# Patient Record
Sex: FEMALE | Race: WHITE | Employment: OTHER | URBAN - METROPOLITAN AREA
[De-identification: names, ages, dates, MRNs, and addresses within clinical notes are randomized per-mention and may not be internally consistent; named-entity substitution may affect disease eponyms.]

---

## 2017-02-17 ENCOUNTER — HOSPITAL ENCOUNTER (OUTPATIENT)
Dept: MAMMOGRAPHY | Age: 67
Discharge: HOME | End: 2017-02-17
Attending: OBSTETRICS & GYNECOLOGY
Payer: MEDICARE

## 2017-02-17 DIAGNOSIS — Z12.31 VISIT FOR SCREENING MAMMOGRAM: ICD-10-CM

## 2017-02-17 PROCEDURE — 77067 SCR MAMMO BI INCL CAD: CPT

## 2017-06-23 PROBLEM — J06.9 URI, ACUTE: Status: ACTIVE | Noted: 2017-06-23

## 2017-06-23 PROBLEM — R13.10 ODYNOPHAGIA: Status: ACTIVE | Noted: 2017-06-23

## 2017-06-30 PROBLEM — R53.83 FATIGUE: Status: ACTIVE | Noted: 2017-06-30

## 2017-06-30 PROBLEM — Z87.790 HISTORY OF BRANCHIAL CLEFT CYST: Status: ACTIVE | Noted: 2017-06-30

## 2017-06-30 PROBLEM — E06.9 THYROIDITIS: Status: ACTIVE | Noted: 2017-06-30

## 2017-07-11 ENCOUNTER — HOSPITAL ENCOUNTER (OUTPATIENT)
Dept: NUCLEAR MEDICINE | Age: 67
Discharge: HOME | End: 2017-07-11
Attending: FAMILY MEDICINE
Payer: MEDICARE

## 2017-07-11 DIAGNOSIS — R79.89 ABNORMAL TSH: ICD-10-CM

## 2017-07-12 ENCOUNTER — HOSPITAL ENCOUNTER (OUTPATIENT)
Dept: NUCLEAR MEDICINE | Age: 67
Discharge: HOME | End: 2017-07-12
Attending: FAMILY MEDICINE
Payer: MEDICARE

## 2017-07-12 PROCEDURE — 78014 THYROID IMAGING W/BLOOD FLOW: CPT

## 2017-07-18 PROBLEM — E06.1 THYROIDITIS, SUBACUTE: Status: ACTIVE | Noted: 2017-06-30

## 2017-07-18 PROBLEM — R07.9 CHEST PAIN: Status: ACTIVE | Noted: 2017-07-18

## 2017-08-03 PROBLEM — E04.2 MULTINODULAR GOITER: Status: ACTIVE | Noted: 2017-08-03

## 2017-08-15 PROBLEM — J06.9 URI, ACUTE: Status: RESOLVED | Noted: 2017-06-23 | Resolved: 2017-08-15

## 2017-09-01 ENCOUNTER — HOSPITAL ENCOUNTER (OUTPATIENT)
Dept: SURGERY | Age: 67
Discharge: HOME | End: 2017-09-01
Attending: SURGERY

## 2017-09-01 VITALS
SYSTOLIC BLOOD PRESSURE: 131 MMHG | HEART RATE: 74 BPM | DIASTOLIC BLOOD PRESSURE: 71 MMHG | OXYGEN SATURATION: 96 % | TEMPERATURE: 97.5 F | WEIGHT: 159 LBS | BODY MASS INDEX: 25.55 KG/M2 | HEIGHT: 66 IN

## 2017-09-01 NOTE — PERIOP NOTES
Patient verified name, , and surgery as listed in [facility]. Type 3 surgery, walk in assessment complete. Labs per surgeon: unknown; no orders received in EMR. Labs per anesthesia protocol: none needed. EKG: not needed per [facility] protocols. Hibiclens and instructions given per hospital policy. Patient provided with and instructed on educational handouts including Guide to Surgery, Pain Management, Hand Hygiene, Blood Transfusion Education, and [facility] Brochure. Patient answered medical/surgical history questions at their best of ability. All prior to admission medications documented in [facility]. Original medication prescription bottles not visualized during patient appointment. Patient instructed to hold all vitamins 7 days prior to surgery and NSAIDS 5 days prior to surgery, patient verbalized understanding. Medications to be held: vitamins. Patient instructed to continue previous medications as prescribed prior to surgery and to take the following medications the day of surgery according to anesthesia guidelines with a small sip of water: 81 mg aspirin, zyrtec, omeprazole, paxil. Patient teach back successful and patient demonstrates knowledge of instructions.

## 2017-09-12 ENCOUNTER — ANESTHESIA EVENT (OUTPATIENT)
Dept: SURGERY | Age: 67
End: 2017-09-12
Payer: MEDICARE

## 2017-09-12 RX ORDER — SODIUM CHLORIDE 0.9 % (FLUSH) 0.9 %
5-10 SYRINGE (ML) INJECTION AS NEEDED
Status: CANCELLED | OUTPATIENT
Start: 2017-09-12

## 2017-09-12 RX ORDER — SODIUM CHLORIDE 0.9 % (FLUSH) 0.9 %
5-10 SYRINGE (ML) INJECTION EVERY 8 HOURS
Status: CANCELLED | OUTPATIENT
Start: 2017-09-12

## 2017-09-12 NOTE — ANESTHESIA PREPROCEDURE EVALUATION
Anesthetic History     PONV          Review of Systems / Medical History  Patient summary reviewed, nursing notes reviewed and pertinent labs reviewed    Pulmonary                   Neuro/Psych              Cardiovascular                  Exercise tolerance: >4 METS     GI/Hepatic/Renal     GERD: well controlled           Endo/Other        Arthritis     Other Findings            Physical Exam    Airway  Mallampati: I  TM Distance: 4 - 6 cm  Neck ROM: normal range of motion   Mouth opening: Normal     Cardiovascular  Regular rate and rhythm,  S1 and S2 normal,  no murmur, click, rub, or gallop             Dental    Dentition: Caps/crowns     Pulmonary  Breath sounds clear to auscultation               Abdominal  GI exam deferred       Other Findings            Anesthetic Plan    ASA: 2  Anesthesia type: general            Anesthetic plan and risks discussed with: Patient and Spouse

## 2017-09-13 ENCOUNTER — ANESTHESIA (OUTPATIENT)
Dept: SURGERY | Age: 67
End: 2017-09-13
Payer: MEDICARE

## 2017-09-13 ENCOUNTER — HOSPITAL ENCOUNTER (OUTPATIENT)
Age: 67
Setting detail: OUTPATIENT SURGERY
Discharge: HOME | End: 2017-09-13
Attending: SURGERY | Admitting: SURGERY
Payer: MEDICARE

## 2017-09-13 VITALS
BODY MASS INDEX: 25.55 KG/M2 | HEART RATE: 62 BPM | RESPIRATION RATE: 18 BRPM | HEIGHT: 66 IN | SYSTOLIC BLOOD PRESSURE: 158 MMHG | DIASTOLIC BLOOD PRESSURE: 70 MMHG | OXYGEN SATURATION: 93 % | WEIGHT: 159 LBS | TEMPERATURE: 97.1 F

## 2017-09-13 DIAGNOSIS — N64.9 NIPPLE LESION: Primary | ICD-10-CM

## 2017-09-13 PROCEDURE — 76060000032 HC ANESTHESIA 0.5 TO 1 HR: Performed by: SURGERY

## 2017-09-13 PROCEDURE — 77030008477 HC STYL SATN SLP COVD -A: Performed by: ANESTHESIOLOGY

## 2017-09-13 PROCEDURE — 76210000020 HC REC RM PH II FIRST 0.5 HR: Performed by: SURGERY

## 2017-09-13 PROCEDURE — 74011000250 HC RX REV CODE- 250

## 2017-09-13 PROCEDURE — 74011250636 HC RX REV CODE- 250/636

## 2017-09-13 PROCEDURE — 77030020782 HC GWN BAIR PAWS FLX 3M -B: Performed by: ANESTHESIOLOGY

## 2017-09-13 PROCEDURE — 74011000250 HC RX REV CODE- 250: Performed by: SURGERY

## 2017-09-13 PROCEDURE — 88304 TISSUE EXAM BY PATHOLOGIST: CPT | Performed by: SURGERY

## 2017-09-13 PROCEDURE — 77030008703 HC TU ET UNCUF COVD -A: Performed by: ANESTHESIOLOGY

## 2017-09-13 PROCEDURE — 74011250636 HC RX REV CODE- 250/636: Performed by: ANESTHESIOLOGY

## 2017-09-13 PROCEDURE — 76010000138 HC OR TIME 0.5 TO 1 HR: Performed by: SURGERY

## 2017-09-13 PROCEDURE — 76210000016 HC OR PH I REC 1 TO 1.5 HR: Performed by: SURGERY

## 2017-09-13 PROCEDURE — 74011000258 HC RX REV CODE- 258: Performed by: SURGERY

## 2017-09-13 PROCEDURE — 74011000250 HC RX REV CODE- 250: Performed by: ANESTHESIOLOGY

## 2017-09-13 PROCEDURE — 74011250637 HC RX REV CODE- 250/637: Performed by: ANESTHESIOLOGY

## 2017-09-13 PROCEDURE — 74011250636 HC RX REV CODE- 250/636: Performed by: SURGERY

## 2017-09-13 RX ORDER — OXYCODONE AND ACETAMINOPHEN 5; 325 MG/1; MG/1
2 TABLET ORAL
Qty: 40 TAB | Refills: 0 | Status: SHIPPED | OUTPATIENT
Start: 2017-09-13 | End: 2018-01-19 | Stop reason: ALTCHOICE

## 2017-09-13 RX ORDER — ROCURONIUM BROMIDE 10 MG/ML
INJECTION, SOLUTION INTRAVENOUS AS NEEDED
Status: DISCONTINUED | OUTPATIENT
Start: 2017-09-13 | End: 2017-09-13 | Stop reason: HOSPADM

## 2017-09-13 RX ORDER — SODIUM CHLORIDE, SODIUM LACTATE, POTASSIUM CHLORIDE, CALCIUM CHLORIDE 600; 310; 30; 20 MG/100ML; MG/100ML; MG/100ML; MG/100ML
125 INJECTION, SOLUTION INTRAVENOUS CONTINUOUS
Status: DISCONTINUED | OUTPATIENT
Start: 2017-09-13 | End: 2017-09-13 | Stop reason: HOSPADM

## 2017-09-13 RX ORDER — ONDANSETRON 2 MG/ML
4 INJECTION INTRAMUSCULAR; INTRAVENOUS
Status: COMPLETED | OUTPATIENT
Start: 2017-09-13 | End: 2017-09-13

## 2017-09-13 RX ORDER — MIDAZOLAM HYDROCHLORIDE 1 MG/ML
2 INJECTION, SOLUTION INTRAMUSCULAR; INTRAVENOUS
Status: DISCONTINUED | OUTPATIENT
Start: 2017-09-13 | End: 2017-09-13 | Stop reason: HOSPADM

## 2017-09-13 RX ORDER — SCOLOPAMINE TRANSDERMAL SYSTEM 1 MG/1
1.5 PATCH, EXTENDED RELEASE TRANSDERMAL ONCE
Status: DISCONTINUED | OUTPATIENT
Start: 2017-09-13 | End: 2017-09-13 | Stop reason: HOSPADM

## 2017-09-13 RX ORDER — NALOXONE HYDROCHLORIDE 0.4 MG/ML
0.1 INJECTION, SOLUTION INTRAMUSCULAR; INTRAVENOUS; SUBCUTANEOUS AS NEEDED
Status: DISCONTINUED | OUTPATIENT
Start: 2017-09-13 | End: 2017-09-13 | Stop reason: HOSPADM

## 2017-09-13 RX ORDER — PROPOFOL 10 MG/ML
INJECTION, EMULSION INTRAVENOUS AS NEEDED
Status: DISCONTINUED | OUTPATIENT
Start: 2017-09-13 | End: 2017-09-13 | Stop reason: HOSPADM

## 2017-09-13 RX ORDER — KETOROLAC TROMETHAMINE 30 MG/ML
15 INJECTION, SOLUTION INTRAMUSCULAR; INTRAVENOUS AS NEEDED
Status: DISCONTINUED | OUTPATIENT
Start: 2017-09-13 | End: 2017-09-13 | Stop reason: HOSPADM

## 2017-09-13 RX ORDER — DEXAMETHASONE SODIUM PHOSPHATE 4 MG/ML
INJECTION, SOLUTION INTRA-ARTICULAR; INTRALESIONAL; INTRAMUSCULAR; INTRAVENOUS; SOFT TISSUE AS NEEDED
Status: DISCONTINUED | OUTPATIENT
Start: 2017-09-13 | End: 2017-09-13 | Stop reason: HOSPADM

## 2017-09-13 RX ORDER — HYDROMORPHONE HYDROCHLORIDE 2 MG/ML
0.5 INJECTION, SOLUTION INTRAMUSCULAR; INTRAVENOUS; SUBCUTANEOUS
Status: DISCONTINUED | OUTPATIENT
Start: 2017-09-13 | End: 2017-09-13 | Stop reason: HOSPADM

## 2017-09-13 RX ORDER — SUCCINYLCHOLINE CHLORIDE 20 MG/ML
INJECTION INTRAMUSCULAR; INTRAVENOUS AS NEEDED
Status: DISCONTINUED | OUTPATIENT
Start: 2017-09-13 | End: 2017-09-13 | Stop reason: HOSPADM

## 2017-09-13 RX ORDER — CEFOXITIN 2 G/1
2 INJECTION, POWDER, FOR SOLUTION INTRAVENOUS EVERY 6 HOURS
Status: DISCONTINUED | OUTPATIENT
Start: 2017-09-13 | End: 2017-09-13 | Stop reason: SDUPTHER

## 2017-09-13 RX ORDER — ACETAMINOPHEN 500 MG
1000 TABLET ORAL ONCE
Status: DISCONTINUED | OUTPATIENT
Start: 2017-09-13 | End: 2017-09-13 | Stop reason: HOSPADM

## 2017-09-13 RX ORDER — LIDOCAINE HYDROCHLORIDE 10 MG/ML
INJECTION INFILTRATION; PERINEURAL AS NEEDED
Status: DISCONTINUED | OUTPATIENT
Start: 2017-09-13 | End: 2017-09-13 | Stop reason: HOSPADM

## 2017-09-13 RX ORDER — SODIUM CHLORIDE 0.9 % (FLUSH) 0.9 %
5-10 SYRINGE (ML) INJECTION AS NEEDED
Status: DISCONTINUED | OUTPATIENT
Start: 2017-09-13 | End: 2017-09-13 | Stop reason: HOSPADM

## 2017-09-13 RX ORDER — OXYCODONE HYDROCHLORIDE 5 MG/1
10 TABLET ORAL
Status: DISCONTINUED | OUTPATIENT
Start: 2017-09-13 | End: 2017-09-13 | Stop reason: HOSPADM

## 2017-09-13 RX ORDER — FENTANYL CITRATE 50 UG/ML
INJECTION, SOLUTION INTRAMUSCULAR; INTRAVENOUS AS NEEDED
Status: DISCONTINUED | OUTPATIENT
Start: 2017-09-13 | End: 2017-09-13 | Stop reason: HOSPADM

## 2017-09-13 RX ORDER — LIDOCAINE HYDROCHLORIDE 10 MG/ML
0.1 INJECTION INFILTRATION; PERINEURAL AS NEEDED
Status: DISCONTINUED | OUTPATIENT
Start: 2017-09-13 | End: 2017-09-13 | Stop reason: HOSPADM

## 2017-09-13 RX ORDER — LIDOCAINE HYDROCHLORIDE 20 MG/ML
INJECTION, SOLUTION EPIDURAL; INFILTRATION; INTRACAUDAL; PERINEURAL AS NEEDED
Status: DISCONTINUED | OUTPATIENT
Start: 2017-09-13 | End: 2017-09-13 | Stop reason: HOSPADM

## 2017-09-13 RX ADMIN — SUCCINYLCHOLINE CHLORIDE 160 MG: 20 INJECTION INTRAMUSCULAR; INTRAVENOUS at 07:29

## 2017-09-13 RX ADMIN — MIDAZOLAM HYDROCHLORIDE 2 MG: 1 INJECTION, SOLUTION INTRAMUSCULAR; INTRAVENOUS at 07:18

## 2017-09-13 RX ADMIN — SODIUM CHLORIDE, SODIUM LACTATE, POTASSIUM CHLORIDE, AND CALCIUM CHLORIDE 125 ML/HR: 600; 310; 30; 20 INJECTION, SOLUTION INTRAVENOUS at 06:21

## 2017-09-13 RX ADMIN — LIDOCAINE HYDROCHLORIDE 0.1 ML: 10 INJECTION, SOLUTION INFILTRATION; PERINEURAL at 06:21

## 2017-09-13 RX ADMIN — PROPOFOL 180 MG: 10 INJECTION, EMULSION INTRAVENOUS at 07:29

## 2017-09-13 RX ADMIN — CEFOXITIN 2 G: 2 INJECTION, POWDER, FOR SOLUTION INTRAVENOUS at 07:22

## 2017-09-13 RX ADMIN — ROCURONIUM BROMIDE 5 MG: 10 INJECTION, SOLUTION INTRAVENOUS at 07:29

## 2017-09-13 RX ADMIN — FENTANYL CITRATE 100 MCG: 50 INJECTION, SOLUTION INTRAMUSCULAR; INTRAVENOUS at 07:29

## 2017-09-13 RX ADMIN — ONDANSETRON 4 MG: 2 INJECTION INTRAMUSCULAR; INTRAVENOUS at 09:11

## 2017-09-13 RX ADMIN — LIDOCAINE HYDROCHLORIDE 100 MG: 20 INJECTION, SOLUTION EPIDURAL; INFILTRATION; INTRACAUDAL; PERINEURAL at 07:29

## 2017-09-13 RX ADMIN — DEXAMETHASONE SODIUM PHOSPHATE 8 MG: 4 INJECTION, SOLUTION INTRA-ARTICULAR; INTRALESIONAL; INTRAMUSCULAR; INTRAVENOUS; SOFT TISSUE at 07:41

## 2017-09-13 NOTE — OP NOTES
[address] 64   OPERATIVE REPORT       Name:  Gerhard Martell   MR#:  [id number]   :  1950   Account #:  [de-identified]   Date of Adm:  2017       DATE OF SURGERY: 2017. PREOPERATIVE DIAGNOSIS: Right nipple cyst.    POSTOPERATIVE DIAGNOSIS: Right nipple cyst, 3 mm. PROCEDURE: Excision of right nipple cyst with sharp dissection   using a #15 scalpel blade. ANESTHESIA: General endotracheal.    SURGEON: Breanna Deal DO.    ASSISTANT: None. COMPLICATIONS: None. DISPOSITION: Stable. COUNTS: Sponge count, needle count, instrument count, all   correct x3. DESCRIPTION OF PROCEDURE: This is a 79-year-old female with a   nipple cyst causing her pain, itching, burning and drainage. She   has had a complete workup with negative biopsies and she is   requesting excision of the cyst. Consent was obtained by   describing the procedure to the patient, including the potential   complications to include infection, bleeding and pain. Consent   was obtained, placed upon the chart. She was administered Ancef   2 grams IV preoperatively, taken to the operative suite, placed   in a supine position. General anesthesia was initiated without   complications. She was then prepped and draped in the usual   sterile fashion and time-out was taken to confirm the patient's   name and proper procedure. The area had been marked   preoperatively and then using a #15 scalpel blade, an excision   of the inferiorly located 3 mm nipple cyst was performed   creating a pie-shaped incision. We dissected down the   subcutaneous tissue and excised the cyst circumferentially and   sent to Pathology for analysis. Pressure was held for 5 minutes   and hemostasis was confirmed. We injected with 1% lidocaine plain   and then reapproximated the skin using a 4-0 Prolene x3. The   patient tolerated the procedure well without immediate   complications. Sterile dressing was applied.     FINDINGS: A 3 mm right nipple cyst excised with a #15 scalpel   blade. She tolerated the procedure well. AYAZ Carney DD / Ashtyn Rivera   D:  09/13/2017   07:55   T:  09/13/2017   08:14   Job #:  [id number]

## 2017-09-13 NOTE — ANESTHESIA POSTPROCEDURE EVALUATION
Post-Anesthesia Evaluation and Assessment    Patient: Gustav Cabot MRN: [id number]  SSN: [id number]    YOB: 1950  Age: 75 y.o. Sex: female       Cardiovascular Function/Vital Signs  Visit Vitals    /69 (BP 1 Location: Right arm, BP Patient Position: At rest)    Pulse 65    Temp 36.2 °C (97.1 °F)    Resp 18    Ht 5' 6\" (1.676 m)    Wt 72.1 kg (159 lb)    SpO2 97%    BMI 25.66 kg/m2       Patient is status post general anesthesia for Procedure(s):  EXCISION OF RIGHT BREAST CYST . Nausea/Vomiting: None    Postoperative hydration reviewed and adequate. Pain:  Pain Scale 1: Visual (09/13/17 0823)  Pain Intensity 1: 0 (09/13/17 0823)   Managed    Neurological Status:   Neuro (WDL): Exceptions to WDL (09/13/17 4910)  Neuro  Neurologic State: Drowsy (09/13/17 9768)   At baseline    Mental Status and Level of Consciousness: Arousable    Pulmonary Status:   O2 Device: Nasal cannula (09/13/17 0838)   Adequate oxygenation and airway patent    Complications related to anesthesia: None    Post-anesthesia assessment completed.  No concerns    Signed By: Shilpa Carreon MD     September 13, 2017

## 2017-09-13 NOTE — INTERVAL H&P NOTE
H&P Update:  Frances Ceballos was seen and examined. History and physical has been reviewed. The patient has been examined.  There have been no significant clinical changes since the completion of the originally dated History and Physical.    Signed By: Ant Baig DO     September 13, 2017 7:14 AM

## 2017-09-13 NOTE — BRIEF OP NOTE
BRIEF OPERATIVE NOTE    Date of Procedure: 9/13/2017   Preoperative Diagnosis: Cyst of breast, right [N60.01]  Postoperative Diagnosis: Cyst of breast, right [N60.01]    Procedure(s):  EXCISION OF RIGHT BREAST CYST   Surgeon(s) and Role:     * John Dunn DO - Primary         Assistant Staff:       Surgical Staff:  Circ-1: Winifred Givens RN  Scrub Tech-1: Christa Wolff  Event Time In   Incision Start 0745   Incision Close 0754     Anesthesia: General   Estimated Blood Loss: Min  Specimens:   ID Type Source Tests Collected by Time Destination   1 : right nipple cyst Preservative Breast  John Dunn DO 9/13/2017 0747 Pathology      Findings: 3mm nipple cyst   Complications: none  Implants: * No implants in log [facility]
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
## 2017-09-13 NOTE — DISCHARGE INSTRUCTIONS
1. MAY SHOWER ONLY. NO TUB BATHING, HOT TUBS OR SWIMMING  2. KEEP INCISION CLEAN WITH REGULAR SOAP AND WATER  3. NO LIFTING OVER 10 POUNDS  4. REGULAR DIET AS TOLERATED  5. MAY REMOVE TOPICAL DRESSING ON DAY #2. LEAVE STERI STRIPS IN PLACE UNTIL SEEN IN OFFICE BY DR. Cassandra Pugh  6.  NO DRIVING WHILE ON PAIN MEDICATION  7. RESUME ALL HOME MEDICATIONS AS USUAL

## 2017-09-13 NOTE — IP AVS SNAPSHOT
Sandra Chand 
 
 
 [address] [address] 
[phone] Patient: Ewa Boateng MRN: [id number] UDW:0/57/3153 You are allergic to the following Allergen Reactions Sulfa (Sulfonamide Antibiotics) Hives Recent Documentation Height Weight BMI OB Status Smoking Status 1.676 m 72.1 kg 25.66 kg/m2 Hysterectomy Former Smoker Emergency Contacts Name Discharge Info Relation Home Work Mobile Benedict Chaudhari (Fiance) DISCHARGE CAREGIVER [3] Spouse [3] [phone] [address]  Son [22] [phone] About your hospitalization You were admitted on:  September 13, 2017 [facility] You were discharged on:  September 13, 2017 Unit phone number:  [phone] Why you were hospitalized Your primary diagnosis was:  Not on File Providers Seen During Your Hospitalizations Provider Role Specialty Primary office phone Elise Billingsley DO Attending Provider General Surgery [phone] Your Primary Care Physician (PCP) Primary Care Physician Office Phone Office Fax Andie Epstein [phone] Follow-up Information Follow up With Details Comments Contact Info Pham Dong MD   [address] [id number] [phone] Brady Betancourt DO Schedule an appointment as soon as possible for a visit in 2 week(s)  [address] [address] [address] [facility] [id number] [phone] Your Appointments Thursday September 21, 2017  9:30 AM EDT  
LAB with [facility] [address]  
[phone] Thursday September 28, 2017  1:30 PM EDT Global Post Op with Brady Betancourt DO  
[facility]) 88 [facility] 187 [facility] [phone] Monday October 02, 2017  3:15 PM EDT  
LONG with Nabila Howard MD  
[facility] MEDICINE ([address]) 96 [facility] 700 [facility]  
[phone] Monday October 09, 2017  2:00 PM EDT Follow Up with Marilyn Munson MD  
[facility] ENDOCRINOLOGY [facility] ENDOCRINOLOGY) [address] [facility] [address] [place] [id number] [phone] Current Discharge Medication List  
  
START taking these medications Dose & Instructions Dispensing Information Comments Morning Noon Evening Bedtime  
 oxyCODONE-acetaminophen 5-325 mg per tablet Commonly known as:  PERCOCET Your last dose was: Your next dose is:    
   
   
 Dose:  2 Tab Take 2 Tabs by mouth every four (4) hours as needed for Pain. Max Daily Amount: 12 Tabs. Quantity:  40 Tab Refills:  0 CONTINUE these medications which have NOT CHANGED Dose & Instructions Dispensing Information Comments Morning Noon Evening Bedtime  
 aspirin delayed-release 81 mg tablet Your last dose was: Your next dose is:    
   
   
 Dose:  81 mg Take 81 mg by mouth daily. Take / use AM day of surgery  per anesthesia protocols. Refills:  0  
     
   
   
   
  
 biotin 2,500 mcg Tab Your last dose was: Your next dose is:    
   
   
 Dose:  1 Tab Take 1 Tab by mouth two (2) times a day. Refills:  0  
     
   
   
   
  
 cholecalciferol 1,000 unit tablet Commonly known as:  VITAMIN D3 Your last dose was: Your next dose is:    
   
   
 Dose:  1000 Units Take 1,000 Units by mouth daily. Refills:  0  
     
   
   
   
  
 multivitamin tablet Commonly known as:  ONE A DAY Your last dose was: Your next dose is:    
   
   
 Dose:  1 Tab Take 1 tablet by mouth daily. Refills:  0  
     
   
   
   
  
 omeprazole 10 mg capsule Commonly known as:  PRILOSEC Your last dose was: Your next dose is:    
   
   
 Dose:  10 mg Take 1 Cap by mouth two (2) times a day. Indications: gastroesophageal reflux disease Quantity:  180 Cap Refills:  1 PARoxetine 10 mg tablet Commonly known as:  PAXIL Your last dose was: Your next dose is:    
   
   
 Dose:  10 mg Take 1 Tab by mouth daily. Quantity:  90 Tab Refills:  1 PROBIOTIC 4X 10-15 mg Tbec Generic drug:  B.infantis-B.ani-B.long-B.bifi Your last dose was: Your next dose is:    
   
   
 Dose:  1 Cap Take 1 Cap by mouth two (2) times a day. Refills:  0 ZyrTEC 10 mg tablet Generic drug:  cetirizine Your last dose was: Your next dose is:    
   
   
 Dose:  10 mg Take 10 mg by mouth daily. Take / use AM day of surgery  per anesthesia protocols. Indications: ALLERGIC RHINITIS Refills:  0 Where to Get Your Medications Information on where to get these meds will be given to you by the nurse or doctor. ! Ask your nurse or doctor about these medications  
  oxyCODONE-acetaminophen 5-325 mg per tablet Discharge Instructions 1. MAY SHOWER ONLY. NO TUB BATHING, HOT TUBS OR SWIMMING 2. KEEP INCISION CLEAN WITH REGULAR SOAP AND WATER 3. NO LIFTING OVER 10 POUNDS 
4. REGULAR DIET AS TOLERATED 5. MAY REMOVE TOPICAL DRESSING ON DAY #2. LEAVE STERI STRIPS IN PLACE UNTIL SEEN IN OFFICE BY DR. Donald Gupta 6. NO DRIVING WHILE ON PAIN MEDICATION 7. RESUME ALL HOME MEDICATIONS AS USUAL Discharge Orders None ACO Transitions of Care [address] offers a voluntary care coordination program to provide high quality service and care to [facility] fee-for-service beneficiaries. Yovana Nino was designed to help you enhance your health and well-being through the following services: ? Transitions of Care  support for individuals who are transitioning from one care setting to another (example: Hospital to home). ? Chronic and Complex Care Coordination  support for individuals and caregivers of those with serious or chronic illnesses or with more than one chronic (ongoing) condition and those who take a number of different medications. If you meet specific medical criteria, a [address] [place] [address] Rd may call you directly to coordinate your care with your primary care physician and your other care providers. For questions about the [facility] programs, please, contact your physicians office. For general questions or additional information about Accountable Care Organizations: 
Please visit www.medicare.gov/acos. html or call 1-800-MEDICARE (9-665.457.4103) TTY users should call 6-244.164.6677. Introducing [facility]! Dear Wilton Donohue: Thank you for requesting a Rezdy account. Our records indicate that you already have an active Rezdy account. You can access your account anytime at [email or web address] Did you know that you can access your hospital and ER discharge instructions at any time in Rezdy? You can also review all of your test results from your hospital stay or ER visit. Additional Information If you have questions, please visit the Frequently Asked Questions section of the Rezdy website at [email or web address] Remember, Rezdy is NOT to be used for urgent needs. For medical emergencies, dial 911. Now available from your iPhone and Android! General Information Please provide this summary of care documentation to your next provider.  
  
  
    
    
 Patient Signature: ____________________________________________________________ Date:  ____________________________________________________________  
  
Jonny Cart Provider Signature:  ____________________________________________________________ Date:  ____________________________________________________________

## 2017-10-02 PROBLEM — R53.83 FATIGUE: Status: RESOLVED | Noted: 2017-06-30 | Resolved: 2017-10-02

## 2017-10-02 PROBLEM — R07.9 CHEST PAIN: Status: RESOLVED | Noted: 2017-07-18 | Resolved: 2017-10-02

## 2018-01-10 ENCOUNTER — HOSPITAL ENCOUNTER (OUTPATIENT)
Dept: LAB | Age: 68
Discharge: HOME | End: 2018-01-10
Payer: MEDICARE

## 2018-01-10 DIAGNOSIS — E06.1 SUBACUTE THYROIDITIS: ICD-10-CM

## 2018-01-10 LAB
T4 FREE SERPL-MCNC: 0.8 NG/DL (ref 0.78–1.46)
TSH SERPL DL<=0.005 MIU/L-ACNC: 2.17 UIU/ML (ref 0.36–3.74)

## 2018-01-10 PROCEDURE — 84443 ASSAY THYROID STIM HORMONE: CPT | Performed by: INTERNAL MEDICINE

## 2018-01-10 PROCEDURE — 84439 ASSAY OF FREE THYROXINE: CPT | Performed by: INTERNAL MEDICINE

## 2018-01-10 PROCEDURE — 36415 COLL VENOUS BLD VENIPUNCTURE: CPT | Performed by: INTERNAL MEDICINE

## 2018-01-24 PROBLEM — C73 PAPILLARY THYROID CARCINOMA (HCC): Status: ACTIVE | Noted: 2018-01-24

## 2018-03-27 ENCOUNTER — HOSPITAL ENCOUNTER (OUTPATIENT)
Dept: MAMMOGRAPHY | Age: 68
Discharge: HOME | End: 2018-03-27
Attending: OBSTETRICS & GYNECOLOGY
Payer: MEDICARE

## 2018-03-27 DIAGNOSIS — Z12.31 VISIT FOR SCREENING MAMMOGRAM: ICD-10-CM

## 2018-03-27 PROCEDURE — 77063 BREAST TOMOSYNTHESIS BI: CPT

## 2018-04-20 PROBLEM — E89.0 POSTSURGICAL HYPOTHYROIDISM: Status: ACTIVE | Noted: 2018-04-20

## 2018-04-20 PROBLEM — E06.1 SUBACUTE THYROIDITIS: Status: RESOLVED | Noted: 2017-06-30 | Resolved: 2018-04-20

## 2018-04-20 PROBLEM — E04.2 MULTINODULAR GOITER: Status: RESOLVED | Noted: 2017-08-03 | Resolved: 2018-04-20

## 2018-10-31 PROBLEM — M25.552 PAIN OF BOTH HIP JOINTS: Status: ACTIVE | Noted: 2018-10-31

## 2018-10-31 PROBLEM — M25.551 PAIN OF BOTH HIP JOINTS: Status: ACTIVE | Noted: 2018-10-31

## 2019-01-09 ENCOUNTER — HOSPITAL ENCOUNTER (OUTPATIENT)
Dept: ULTRASOUND IMAGING | Age: 69
Discharge: HOME | End: 2019-01-09
Attending: INTERNAL MEDICINE
Payer: MEDICARE

## 2019-01-09 DIAGNOSIS — C73 PAPILLARY THYROID CARCINOMA (HCC): ICD-10-CM

## 2019-01-09 PROCEDURE — 76536 US EXAM OF HEAD AND NECK: CPT

## 2019-03-26 ENCOUNTER — HOSPITAL ENCOUNTER (OUTPATIENT)
Dept: PHYSICAL THERAPY | Age: 69
Discharge: HOME | End: 2019-03-26
Payer: MEDICARE

## 2019-03-26 PROCEDURE — 97162 PT EVAL MOD COMPLEX 30 MIN: CPT

## 2019-03-26 PROCEDURE — 97110 THERAPEUTIC EXERCISES: CPT

## 2019-03-27 NOTE — PROGRESS NOTES
Ambulatory/Rehab Services H2 Model Falls Risk Assessment    Risk Factors:       No Risk Factors Identified Ability to Rise from Chair:       (0)  Ability to rise in a single movement    Falls Prevention Plan:       No modifications necessary   Total: (5 or greater = High Risk): 0    ©2010 [facility]. All Rights Reserved. [facility] Patent #8,159,607.  Federal Law prohibits the replication, distribution or use without written permission from [facility]

## 2019-03-27 NOTE — THERAPY EVALUATION
Robel Valente  : 1950  [place] / [place] / Product Type: Medicare /  [address] [place]  at [address], [address]   [phone]   [phone]      OUTPATIENT PHYSICAL THERAPY:Initial Assessment 3/26/2019    ICD-10: Treatment Diagnosis:   M54.41 Lumbago with sciatica, right side     M54.42 Lumbago with sciatica, left side   Precautions/Allergies:   Hydrocodone-acetaminophen and Sulfa (sulfonamide antibiotics)   Fall Risk Score: 0 (? 5 = High Risk)  MD Orders: Evaluate and treat. MEDICAL/REFERRING DIAGNOSIS:  Spinal stenosis, lumbar region with neurogenic claudication [M48.062]   DATE OF ONSET: 2019  REFERRING PHYSICIAN: Ce Garcia PA  RETURN PHYSICIAN APPOINTMENT: TBD     INITIAL ASSESSMENT:  Ms. Lamberto Moody reported a recent onset of lumbago and intermittent bilateral LE radiculopathy in weight bearing postures. A recent MRI revealed lumbar DDD and stenosis, and she has been referred to physical therapy in an effort to decrease her symptoms. PROBLEM LIST (Impacting functional limitations):  1. Decreased Strength  2. Decreased ADL/Functional Activities  3. Increased Pain  4. Decreased Activity Tolerance INTERVENTIONS PLANNED:  1. Home Exercise Program (HEP)  2. Neuromuscular Re-education/Strengthening  3. Range of Motion (ROM)  4. Therapeutic Exercise/Strengthening   TREATMENT PLAN:  Effective Dates: 3/26/2019 TO 2019. Frequency/Duration: 2 times a week for 90 days  GOALS: (Goals have been discussed and agreed upon with patient.)  Short-Term Functional Goals: Time Frame: 30 days. 1. Decrease Oswestry Index 4 points to allow improved ADLs with sitting and lifting. 2. Decrease AROM limits to moderate to decrease pain 1-2 levels. 3. Increase lumbar strength to decrease pain 1-2 levels. Discharge Goals: Time Frame: 90 days. .  1. Decrease Oswestry Index 8 points to allow progression to an independent home exercise program.  2. Decrease trunk AROM limits to minimal to decrease pain +2 levels. 3. Demonstrate independence in home exercises to allow DC from physical therapy. Rehabilitation Potential For Stated Goals: Good  Regarding Katharine Chaudhari's therapy, I certify that the treatment plan above will be carried out by a therapist or under their direction. Thank you for this referral,  Mercedes Low, PT     Referring Physician Signature: ANNY Mensah              Date                    The information in this section was collected on 3/26/2019 (except where otherwise noted). HISTORY:   History of Present Injury/Illness (Reason for Referral): The patient reported a recent onset of lumbago and intermittent bilateral LE radiculopathy in weight bearing postures. A recent MRI revealed lumbar DDD and stenosis, and she has been referred to physical therapy in an effort to decrease her symptoms. Past Medical History/Comorbidities:   Ms. Tiffani Varela  has a past medical history of Allergic rhinitis, cause unspecified, Anxiety, Arthritis, Asthma, Cancer ([address] (12/2014), Depression, Dyslipidemia, Former smoker, GERD (gastroesophageal reflux disease), Hyperglycemia, Hypertension, Subacute thyroiditis (2017), Thyroid cancer ([address] and Unspecified sleep apnea. She also has no past medical history of Adverse effect of anesthesia, Aneurysm ([address] Arrhythmia, Autoimmune disease ([address] CAD (coronary artery disease), Chronic kidney disease, Chronic obstructive pulmonary disease ([address] Coagulation disorder ([address] Difficult intubation, Endocarditis, Heart failure ([address] Ill-defined condition, Liver disease, Malignant hyperthermia due to anesthesia, Nausea & vomiting, Nicotine vapor product user, Non-nicotine vapor product user, Pseudocholinesterase deficiency, PUD (peptic ulcer disease), Rheumatic fever, Seizures ([address] Stroke ([address] or Thromboembolus ([address]   Ms. Tiffani Varela  has a past surgical history that includes hx breast reduction (Bilateral, 2006); hx tonsillectomy (2000); hx adenoidectomy; hx orthopaedic (Right, 2014); hx orthopaedic (Bilateral); hx orthopaedic (Left); hx gastrectomy (2015); hx bunionectomy (Right, 2013); hx other surgical (1988/2002,2013); hx other surgical (Right, 1976); hx bunionectomy (Left, 2012); hx jory and bso (2000); hx colonoscopy (2012); hx other surgical (Right, 2017); and hx thyroidectomy (2018). Social History/Living Environment:    Independent  Prior Level of Function/Work/Activity:  Retired  Dominant Side:         RIGHT  Current Medications:       Current Outpatient Medications:     levothyroxine (SYNTHROID) 100 mcg tablet, Take 1 Tab by mouth Daily (before breakfast). , Disp: 90 Tab, Rfl: 3    montelukast (SINGULAIR) 10 mg tablet, Take 1 Tab by mouth daily. , Disp: 90 Tab, Rfl: 3    omeprazole (PRILOSEC) 20 mg capsule, Take 1 Cap by mouth two (2) times a day., Disp: 180 Cap, Rfl: 3    PARoxetine (PAXIL) 10 mg tablet, Take 1 Tab by mouth daily. , Disp: 90 Tab, Rfl: 1    varicella-zoster recombinant, PF, (SHINGRIX, PF,) 50 mcg/0.5 mL susr injection, 0.5mL by IntraMUSCular route once now and then repeat in 2-6 months, Disp: 0.5 mL, Rfl: 1    B.infantis-B.ani-B.long-B.bifi (PROBIOTIC 4X) 10-15 mg TbEC, Take 1 Cap by mouth two (2) times a day., Disp: , Rfl:     aspirin delayed-release 81 mg tablet, Take 81 mg by mouth daily. Take / use AM day of surgery  per anesthesia protocols. , Disp: , Rfl:     multivitamin (ONE A DAY) tablet, Take 1 tablet by mouth daily. , Disp: , Rfl:     cholecalciferol (VITAMIN D3) 1,000 unit tablet, Take 1,000 Units by mouth daily. , Disp: , Rfl:     biotin 2,500 mcg tab, Take 1 Tab by mouth two (2) times a day., Disp: , Rfl:    Date Last Reviewed:  3/26/2019   Number of Personal Factors/Comorbidities that affect the Plan of Care: 3+: HIGH COMPLEXITY   EXAMINATION:   Observation/Orthostatic Postural Assessment:    Mildly kyphotic.   Palpation:  Tenderness with palpation to the patient's lumbar spine. ROM: TRUNK AROM   Extension     Severe Limit   Flexion          Minimal Limit   Rotations      Moderate Limit                 Strength: LUMBAR SPINE   +3/5 abdominal strength   -4/5 R LE Strength    4/5 L LE Strength           Special Tests: N/A  Neurological Screen: SLR negative (B), but does experience bilateral LE radiculopathy with prolonged weight bearing activities. Functional Mobility: Independent   Balance:  Good. Body Structures Involved:  1. Bones  2. Joints  3. Muscles Body Functions Affected:  1. Sensory/Pain  2. Neuromusculoskeletal  3. Movement Related Activities and Participation Affected:  1. General Tasks and Demands  2. Mobility  3. Community, Social and [place]   Number of elements (examined above) that affect the Plan of Care: 4+: HIGH COMPLEXITY   CLINICAL PRESENTATION:   Presentation: Evolving clinical presentation with changing clinical characteristics: MODERATE COMPLEXITY   CLINICAL DECISION MAKING:   Outcome Measure: Tool Used: Modified Oswestry Low Back Pain Questionnaire  Score:  Initial: 14/50  Most Recent: X/50 (Date: -- )   Interpretation of Score: Each section is scored on a 0-5 scale, 5 representing the greatest disability. The scores of each section are added together for a total score of 50. Medical Necessity:   · Patient demonstrates good rehab potential due to higher previous functional level. Reason for Services/Other Comments:  · Patient continues to require skilled intervention due to her limited ability to perform weight bearing ADLs due to lumbago and bilateral sciatica. .   Use of outcome tool(s) and clinical judgement create a POC that gives a: Questionable prediction of patient's progress: MODERATE COMPLEXITY            TREATMENT:   (In addition to Assessment/Re-Assessment sessions the following treatments were rendered)  Pre-treatment Symptoms/Complaints:  The patient reported a recent onset of lumbago and intermittent bilateral LE radiculopathy in weight bearing postures. A recent MRI revealed lumbar DDD and stenosis, and she has been referred to physical therapy in an effort to decrease her symptoms. Pain: Initial:   Pain Intensity 1: 8  Post Session:  8   Therapeutic Exercise: ( 25): The patient received treatment as below to improve mobility, strength and balance. Required moderate verbal and manual cues to promote proper body alignment, promote proper body posture and promote proper body mechanics. Progressed resistance, range and repetitions as indicated. The patient received exercise instruction followed by patient demonstration of the exercises to include abdominals in supine to include single hip and knee extension, and posterior pelvic tilts. Also hip flexor flexibility exercises to stabilize the lumbar spine and improved AROM to decrease pain and spasms allowing improved function. [employer] Portal  Evaluation: ( X )  Manual Therapy (     ):   Therapeutic Modalities:                                                                                               HEP: As above; handouts given to patient for all exercises. Treatment/Session Assessment:    · Response to Treatment: The patient tolerated today's treatment without symptom exacerbation. The patient demonstrated independence with the initial home exercises and is to perform the exercises in conjunction with continued physical therapy. The patient should progress to an independent home exercise program within a few weeks  · Compliance with Program/Exercises: Will assess as treatment progresses. · Recommendations/Intent for next treatment session: \"Next visit will focus on advancements to more challenging activities\".   Total Treatment Duration:  PT Patient Time In/Time Out  Time In: 1300  Time Out: [address] PT

## 2019-03-28 ENCOUNTER — HOSPITAL ENCOUNTER (OUTPATIENT)
Dept: PHYSICAL THERAPY | Age: 69
Discharge: HOME | End: 2019-03-28
Payer: MEDICARE

## 2019-03-28 PROCEDURE — 97110 THERAPEUTIC EXERCISES: CPT

## 2019-03-28 NOTE — PROGRESS NOTES
Rodolfo Severs  : 1950  [place] / Product Type: Medicare /  [address]  at [facility]  [address], [address]  [phone]   [phone]      OUTPATIENT PHYSICAL THERAPY:Daily Note 3/28/2019    ICD-10: Treatment Diagnosis:   M54.41 Lumbago with sciatica, right side     M54.42 Lumbago with sciatica, left side   Precautions/Allergies:   Hydrocodone-acetaminophen and Sulfa (sulfonamide antibiotics)   Fall Risk Score: 0 (? 5 = High Risk)  MD Orders: Evaluate and treat. MEDICAL/REFERRING DIAGNOSIS:  Spinal stenosis, lumbar region with neurogenic claudication [M48.062]   DATE OF ONSET: 2019  REFERRING PHYSICIAN: Ce Garcia PA  RETURN PHYSICIAN APPOINTMENT: TBD     INITIAL ASSESSMENT:  Ms. Gracia Bermudez reported a recent onset of lumbago and intermittent bilateral LE radiculopathy in weight bearing postures. A recent MRI revealed lumbar DDD and stenosis, and she has been referred to physical therapy in an effort to decrease her symptoms. PROBLEM LIST (Impacting functional limitations):  1. Decreased Strength  2. Decreased ADL/Functional Activities  3. Increased Pain  4. Decreased Activity Tolerance INTERVENTIONS PLANNED:  1. Home Exercise Program (HEP)  2. Neuromuscular Re-education/Strengthening  3. Range of Motion (ROM)  4. Therapeutic Exercise/Strengthening   TREATMENT PLAN:  Effective Dates: 3/26/2019 TO 2019. Frequency/Duration: 2 times a week for 90 days  GOALS: (Goals have been discussed and agreed upon with patient.)  Short-Term Functional Goals: Time Frame: 30 days. 1. Decrease Oswestry Index 4 points to allow improved ADLs with sitting and lifting. 2. Decrease AROM limits to moderate to decrease pain 1-2 levels. 3. Increase lumbar strength to decrease pain 1-2 levels. Discharge Goals: Time Frame: 90 days. .  1. Decrease Oswestry Index 8 points to allow progression to an independent home exercise program.  2. Decrease trunk AROM limits to minimal to decrease pain +2 levels. 3. Demonstrate independence in home exercises to allow DC from physical therapy. Rehabilitation Potential For Stated Goals: Good  Regarding Pamela Chaudhari's therapy, I certify that the treatment plan above will be carried out by a therapist or under their direction. Thank you for this referral,  Antelmo Son, PT                 The information in this section was collected on 3/26/2019 (except where otherwise noted). HISTORY:   History of Present Injury/Illness (Reason for Referral): The patient reported a recent onset of lumbago and intermittent bilateral LE radiculopathy in weight bearing postures. A recent MRI revealed lumbar DDD and stenosis, and she has been referred to physical therapy in an effort to decrease her symptoms. Past Medical History/Comorbidities:   Ms. Omer Mccullough  has a past medical history of Allergic rhinitis, cause unspecified, Anxiety, Arthritis, Asthma, Cancer ([address] (12/2014), Depression, Dyslipidemia, Former smoker, GERD (gastroesophageal reflux disease), Hyperglycemia, Hypertension, Subacute thyroiditis (2017), Thyroid cancer ([address] and Unspecified sleep apnea. She also has no past medical history of Adverse effect of anesthesia, Aneurysm ([address] Arrhythmia, Autoimmune disease ([address] CAD (coronary artery disease), Chronic kidney disease, Chronic obstructive pulmonary disease ([address] Coagulation disorder ([address] Difficult intubation, Endocarditis, Heart failure ([address] Ill-defined condition, Liver disease, Malignant hyperthermia due to anesthesia, Nausea & vomiting, Nicotine vapor product user, Non-nicotine vapor product user, Pseudocholinesterase deficiency, PUD (peptic ulcer disease), Rheumatic fever, Seizures ([address] Stroke ([address] or Thromboembolus ([address]   Ms. Omer Mccullough  has a past surgical history that includes hx breast reduction (Bilateral, 2006); hx tonsillectomy (2000); hx adenoidectomy; hx orthopaedic (Right, 2014); hx orthopaedic (Bilateral); hx orthopaedic (Left); hx gastrectomy (2015); hx bunionectomy (Right, 2013); hx other surgical (1988/2002,2013); hx other surgical (Right, 1976); hx bunionectomy (Left, 2012); hx jory and bso (2000); hx colonoscopy (2012); hx other surgical (Right, 2017); and hx thyroidectomy (2018). Social History/Living Environment:    Independent  Prior Level of Function/Work/Activity:  Retired  Dominant Side:         RIGHT  Current Medications:       Current Outpatient Medications:     levothyroxine (SYNTHROID) 100 mcg tablet, Take 1 Tab by mouth Daily (before breakfast). , Disp: 90 Tab, Rfl: 3    montelukast (SINGULAIR) 10 mg tablet, Take 1 Tab by mouth daily. , Disp: 90 Tab, Rfl: 3    omeprazole (PRILOSEC) 20 mg capsule, Take 1 Cap by mouth two (2) times a day., Disp: 180 Cap, Rfl: 3    PARoxetine (PAXIL) 10 mg tablet, Take 1 Tab by mouth daily. , Disp: 90 Tab, Rfl: 1    varicella-zoster recombinant, PF, (SHINGRIX, PF,) 50 mcg/0.5 mL susr injection, 0.5mL by IntraMUSCular route once now and then repeat in 2-6 months, Disp: 0.5 mL, Rfl: 1    B.infantis-B.ani-B.long-B.bifi (PROBIOTIC 4X) 10-15 mg TbEC, Take 1 Cap by mouth two (2) times a day., Disp: , Rfl:     aspirin delayed-release 81 mg tablet, Take 81 mg by mouth daily. Take / use AM day of surgery  per anesthesia protocols. , Disp: , Rfl:     multivitamin (ONE A DAY) tablet, Take 1 tablet by mouth daily. , Disp: , Rfl:     cholecalciferol (VITAMIN D3) 1,000 unit tablet, Take 1,000 Units by mouth daily. , Disp: , Rfl:     biotin 2,500 mcg tab, Take 1 Tab by mouth two (2) times a day., Disp: , Rfl:    Date Last Reviewed:  3/28/2019   Number of Personal Factors/Comorbidities that affect the Plan of Care: 3+: HIGH COMPLEXITY   EXAMINATION:   Observation/Orthostatic Postural Assessment:    Mildly kyphotic. Palpation:  Tenderness with palpation to the patient's lumbar spine.   ROM: TRUNK AROM   Extension     Severe Limit   Flexion Minimal Limit   Rotations      Moderate Limit                 Strength: LUMBAR SPINE   +3/5 abdominal strength   -4/5 R LE Strength    4/5 L LE Strength           Special Tests: N/A  Neurological Screen: SLR negative (B), but does experience bilateral LE radiculopathy with prolonged weight bearing activities. Functional Mobility: Independent   Balance:  Good. Body Structures Involved:  1. Bones  2. Joints  3. Muscles Body Functions Affected:  1. Sensory/Pain  2. Neuromusculoskeletal  3. Movement Related Activities and Participation Affected:  1. General Tasks and Demands  2. Mobility  3. Community, Social and [place]   Number of elements (examined above) that affect the Plan of Care: 4+: HIGH COMPLEXITY   CLINICAL PRESENTATION:   Presentation: Evolving clinical presentation with changing clinical characteristics: MODERATE COMPLEXITY   CLINICAL DECISION MAKING:   Outcome Measure: Tool Used: Modified Oswestry Low Back Pain Questionnaire  Score:  Initial: 14/50  Most Recent: X/50 (Date: -- )   Interpretation of Score: Each section is scored on a 0-5 scale, 5 representing the greatest disability. The scores of each section are added together for a total score of 50. Medical Necessity:   · Patient demonstrates good rehab potential due to higher previous functional level. Reason for Services/Other Comments:  · Patient continues to require skilled intervention due to her limited ability to perform weight bearing ADLs due to lumbago and bilateral sciatica. .   Use of outcome tool(s) and clinical judgement create a POC that gives a: Questionable prediction of patient's progress: MODERATE COMPLEXITY            TREATMENT:   (In addition to Assessment/Re-Assessment sessions the following treatments were rendered)  Pre-treatment Symptoms/Complaints:  The patient reported muscle soreness today from the exercises. Pain: Initial:   Pain Intensity 1: 8  Post Session:  8   Therapeutic Exercise: ( 55):   The patient received treatment as below to improve mobility, strength and balance. Required moderate verbal and manual cues to promote proper body alignment, promote proper body posture and promote proper body mechanics. Progressed resistance, range and repetitions as indicated. The patient received exercise instruction followed by patient demonstration of the exercises to include abdominals in supine to include single hip and knee extension, and posterior pelvic tilts. Also hip flexor flexibility exercises to stabilize the lumbar spine and improved AROM to decrease pain and spasms allowing improved function. Date:  3/28/2019 Date:   Date:     Activity/Exercise Parameters Parameters Parameters   MAT  EXERCISES:      Hip ER / Abduction      Hip Adduction (Ball Squeeze)      Bridging      Straight Leg Raise      SAQ      Hip Abduction (Sidelying)      Gluteus Medius (Clam Shell)      Sit To Stand            STANDING HIP MACHINE:      Hip Abduction 12@ 2 x 10(B)     Hip Flexion 12@ 2 x 10(B)     Hip Extension 25@ 2 x 10(B)     Hip Adduction      Standing Knee Extension      4 Way Cable Walkout      Gluteus Medius (Crab Walk)      Knee Extension      Knee Flexion      Step Downs            Stationary Bike      Treadmill      NuStep 10 min           FLEXIBILITY:      Heel Cord      Hamstring 10 min     Hip Flexors 10 min     Trunk Rotators 10 min       [employer] Portal  Evaluation: (  )  Manual Therapy (     ):   Therapeutic Modalities:                                                                                               HEP: As above; handouts given to patient for all exercises. Treatment/Session Assessment:    · Response to Treatment: The patient tolerated today's treatment without symptom exacerbation. The patient demonstrated exercise tolerance to the new exercises and required manual and verbal cuing for proper technique.   The patient should progress to an independent home exercise program within a few weeks  · Compliance with Program/Exercises: Will assess as treatment progresses. · Recommendations/Intent for next treatment session: \"Next visit will focus on advancements to more challenging activities\".   Total Treatment Duration:  PT Patient Time In/Time Out  Time In: 1500  Time Out: Brad Villagomez, PT

## 2019-04-02 ENCOUNTER — HOSPITAL ENCOUNTER (OUTPATIENT)
Dept: PHYSICAL THERAPY | Age: 69
Discharge: HOME | End: 2019-04-02
Payer: MEDICARE

## 2019-04-02 PROCEDURE — 97110 THERAPEUTIC EXERCISES: CPT

## 2019-04-03 NOTE — PROGRESS NOTES
Nini Yuval  : 1950  [place] / [place] / Product Type: Medicare /  [address]  at [facility]  [address], [address]  [phone]   [phone]      OUTPATIENT PHYSICAL THERAPY:Daily Note 2019    ICD-10: Treatment Diagnosis:   M54.41 Lumbago with sciatica, right side     M54.42 Lumbago with sciatica, left side   Precautions/Allergies:   Hydrocodone-acetaminophen and Sulfa (sulfonamide antibiotics)   Fall Risk Score: 0 (? 5 = High Risk)  MD Orders: Evaluate and treat. MEDICAL/REFERRING DIAGNOSIS:  Spinal stenosis, lumbar region with neurogenic claudication [M48.062]   DATE OF ONSET: 2019  REFERRING PHYSICIAN: Ce Garcia PA  RETURN PHYSICIAN APPOINTMENT: TBD     INITIAL ASSESSMENT:  Ms. Angella Denise reported a recent onset of lumbago and intermittent bilateral LE radiculopathy in weight bearing postures. A recent MRI revealed lumbar DDD and stenosis, and she has been referred to physical therapy in an effort to decrease her symptoms. PROBLEM LIST (Impacting functional limitations):  1. Decreased Strength  2. Decreased ADL/Functional Activities  3. Increased Pain  4. Decreased Activity Tolerance INTERVENTIONS PLANNED:  1. Home Exercise Program (HEP)  2. Neuromuscular Re-education/Strengthening  3. Range of Motion (ROM)  4. Therapeutic Exercise/Strengthening   TREATMENT PLAN:  Effective Dates: 3/26/2019 TO 2019. Frequency/Duration: 2 times a week for 90 days  GOALS: (Goals have been discussed and agreed upon with patient.)  Short-Term Functional Goals: Time Frame: 30 days. 1. Decrease Oswestry Index 4 points to allow improved ADLs with sitting and lifting. 2. Decrease AROM limits to moderate to decrease pain 1-2 levels. 3. Increase lumbar strength to decrease pain 1-2 levels. Discharge Goals: Time Frame: 90 days. .  1. Decrease Oswestry Index 8 points to allow progression to an independent home exercise program.  2. Decrease trunk AROM limits to minimal to decrease pain +2 levels. 3. Demonstrate independence in home exercises to allow DC from physical therapy. Rehabilitation Potential For Stated Goals: Good  Regarding Sujit Chaudhari's therapy, I certify that the treatment plan above will be carried out by a therapist or under their direction. Thank you for this referral,  Juan Canada., PT                 The information in this section was collected on 3/26/2019 (except where otherwise noted). HISTORY:   History of Present Injury/Illness (Reason for Referral): The patient reported a recent onset of lumbago and intermittent bilateral LE radiculopathy in weight bearing postures. A recent MRI revealed lumbar DDD and stenosis, and she has been referred to physical therapy in an effort to decrease her symptoms. Past Medical History/Comorbidities:   Ms. Funmilayo Guillen  has a past medical history of Allergic rhinitis, cause unspecified, Anxiety, Arthritis, Asthma, Cancer ([address] (12/2014), Depression, Dyslipidemia, Former smoker, GERD (gastroesophageal reflux disease), Hyperglycemia, Hypertension, Subacute thyroiditis (2017), Thyroid cancer ([address] and Unspecified sleep apnea. She also has no past medical history of Adverse effect of anesthesia, Aneurysm ([address] Arrhythmia, Autoimmune disease ([address] CAD (coronary artery disease), Chronic kidney disease, Chronic obstructive pulmonary disease ([address] Coagulation disorder ([address] Difficult intubation, Endocarditis, Heart failure ([address] Ill-defined condition, Liver disease, Malignant hyperthermia due to anesthesia, Nausea & vomiting, Nicotine vapor product user, Non-nicotine vapor product user, Pseudocholinesterase deficiency, PUD (peptic ulcer disease), Rheumatic fever, Seizures ([address] Stroke ([address] or Thromboembolus ([address]   Ms. Funmilayo Guillen  has a past surgical history that includes hx breast reduction (Bilateral, 2006); hx tonsillectomy (2000); hx adenoidectomy; hx orthopaedic (Right, 2014); hx orthopaedic (Bilateral); hx orthopaedic (Left); hx gastrectomy (2015); hx bunionectomy (Right, 2013); hx other surgical (1988/2002,2013); hx other surgical (Right, 1976); hx bunionectomy (Left, 2012); hx jory and bso (2000); hx colonoscopy (2012); hx other surgical (Right, 2017); and hx thyroidectomy (2018). Social History/Living Environment:    Independent  Prior Level of Function/Work/Activity:  Retired  Dominant Side:         RIGHT  Current Medications:       Current Outpatient Medications:     levothyroxine (SYNTHROID) 100 mcg tablet, Take 1 Tab by mouth Daily (before breakfast). , Disp: 90 Tab, Rfl: 3    montelukast (SINGULAIR) 10 mg tablet, Take 1 Tab by mouth daily. , Disp: 90 Tab, Rfl: 3    omeprazole (PRILOSEC) 20 mg capsule, Take 1 Cap by mouth two (2) times a day., Disp: 180 Cap, Rfl: 3    PARoxetine (PAXIL) 10 mg tablet, Take 1 Tab by mouth daily. , Disp: 90 Tab, Rfl: 1    varicella-zoster recombinant, PF, (SHINGRIX, PF,) 50 mcg/0.5 mL susr injection, 0.5mL by IntraMUSCular route once now and then repeat in 2-6 months, Disp: 0.5 mL, Rfl: 1    B.infantis-B.ani-B.long-B.bifi (PROBIOTIC 4X) 10-15 mg TbEC, Take 1 Cap by mouth two (2) times a day., Disp: , Rfl:     aspirin delayed-release 81 mg tablet, Take 81 mg by mouth daily. Take / use AM day of surgery  per anesthesia protocols. , Disp: , Rfl:     multivitamin (ONE A DAY) tablet, Take 1 tablet by mouth daily. , Disp: , Rfl:     cholecalciferol (VITAMIN D3) 1,000 unit tablet, Take 1,000 Units by mouth daily. , Disp: , Rfl:     biotin 2,500 mcg tab, Take 1 Tab by mouth two (2) times a day., Disp: , Rfl:    Date Last Reviewed:  4/2/2019   Number of Personal Factors/Comorbidities that affect the Plan of Care: 3+: HIGH COMPLEXITY   EXAMINATION:   Observation/Orthostatic Postural Assessment:    Mildly kyphotic. Palpation:  Tenderness with palpation to the patient's lumbar spine.   ROM: TRUNK AROM   Extension     Severe Limit   Flexion Minimal Limit   Rotations      Moderate Limit                 Strength: LUMBAR SPINE   +3/5 abdominal strength   -4/5 R LE Strength    4/5 L LE Strength           Special Tests: N/A  Neurological Screen: SLR negative (B), but does experience bilateral LE radiculopathy with prolonged weight bearing activities. Functional Mobility: Independent   Balance:  Good. Body Structures Involved:  1. Bones  2. Joints  3. Muscles Body Functions Affected:  1. Sensory/Pain  2. Neuromusculoskeletal  3. Movement Related Activities and Participation Affected:  1. General Tasks and Demands  2. Mobility  3. Community, Social and [place]   Number of elements (examined above) that affect the Plan of Care: 4+: HIGH COMPLEXITY   CLINICAL PRESENTATION:   Presentation: Evolving clinical presentation with changing clinical characteristics: MODERATE COMPLEXITY   CLINICAL DECISION MAKING:   Outcome Measure: Tool Used: Modified Oswestry Low Back Pain Questionnaire  Score:  Initial: 14/50  Most Recent: X/50 (Date: -- )   Interpretation of Score: Each section is scored on a 0-5 scale, 5 representing the greatest disability. The scores of each section are added together for a total score of 50. Medical Necessity:   · Patient demonstrates good rehab potential due to higher previous functional level. Reason for Services/Other Comments:  · Patient continues to require skilled intervention due to her limited ability to perform weight bearing ADLs due to lumbago and bilateral sciatica. .   Use of outcome tool(s) and clinical judgement create a POC that gives a: Questionable prediction of patient's progress: MODERATE COMPLEXITY            TREATMENT:   (In addition to Assessment/Re-Assessment sessions the following treatments were rendered)  Pre-treatment Symptoms/Complaints:  The patient is encouraged with her exercise tolerance. Pain: Initial:   Pain Intensity 1: 8  Post Session:  6   Therapeutic Exercise: ( 55):   The patient received treatment as below to improve mobility, strength and balance. Required moderate verbal and manual cues to promote proper body alignment, promote proper body posture and promote proper body mechanics. Progressed resistance, range and repetitions as indicated. The patient received exercise instruction followed by patient demonstration of the exercises to include abdominals in supine to include single hip and knee extension, and posterior pelvic tilts. Also hip flexor flexibility exercises to stabilize the lumbar spine and improved AROM to decrease pain and spasms allowing improved function. Date:  3/28/2019 Date:  4/2/2019 Date:     Activity/Exercise Parameters Parameters Parameters   MAT  EXERCISES:      Hip ER / Abduction      Hip Adduction (Ball Squeeze)      Bridging      Straight Leg Raise      SAQ      Hip Abduction (Sidelying)      Gluteus Medius (Clam Shell)      Sit To Stand            STANDING HIP MACHINE:      Hip Abduction 12@ 2 x 10(B) 12@ 2 x 10(B)    Hip Flexion 12@ 2 x 10(B) 12@ 2 x 10(B)    Hip Extension 25@ 2 x 10(B) 25@ 2 x 10(B)    Hip Adduction      Standing Knee Extension      4 Way Cable Walkout      Gluteus Medius (Crab Walk)      Knee Extension  10@ 2 x 10(B)    Knee Flexion  15@ 2 x 10(B)    Step Downs            Stationary Bike      Treadmill      NuStep 10 min 15 min          FLEXIBILITY:      Heel Cord      Hamstring 10 min 10 min    Hip Flexors 10 min 10 min    Trunk Rotators 10 min 10 min      [facility] Portal  Evaluation: (  )  Manual Therapy (     ):   Therapeutic Modalities:                                                                                               HEP: As above; handouts given to patient for all exercises. Treatment/Session Assessment:    · Response to Treatment: The patient tolerated today's treatment without symptom exacerbation.   The patient demonstrated exercise tolerance to the exercises and required manual and verbal cuing for proper technique, and is encouraged. The patient should progress to an independent home exercise program within a few weeks  · Compliance with Program/Exercises: Will assess as treatment progresses. · Recommendations/Intent for next treatment session: \"Next visit will focus on advancements to more challenging activities\".   Total Treatment Duration:  PT Patient Time In/Time Out  Time In: 1400  Time Out: [address] Viridiana Cuevas., PT

## 2019-04-04 ENCOUNTER — HOSPITAL ENCOUNTER (OUTPATIENT)
Dept: PHYSICAL THERAPY | Age: 69
Discharge: HOME | End: 2019-04-04
Payer: MEDICARE

## 2019-04-04 PROCEDURE — 97110 THERAPEUTIC EXERCISES: CPT

## 2019-04-05 NOTE — PROGRESS NOTES
Kate Bryant  : 1950  [place] / Product Type: Medicare /  [address]  at [facility]  [address], [address]  [phone]   [phone]      OUTPATIENT PHYSICAL THERAPY:Daily Note 2019    ICD-10: Treatment Diagnosis:   M54.41 Lumbago with sciatica, right side     M54.42 Lumbago with sciatica, left side   Precautions/Allergies:   Hydrocodone-acetaminophen and Sulfa (sulfonamide antibiotics)   Fall Risk Score: 0 (? 5 = High Risk)  MD Orders: Evaluate and treat. MEDICAL/REFERRING DIAGNOSIS:  Spinal stenosis, lumbar region with neurogenic claudication [M48.062]   DATE OF ONSET: 2019  REFERRING PHYSICIAN: Ce Garcia PA  RETURN PHYSICIAN APPOINTMENT: TBD     INITIAL ASSESSMENT:  Ms. Aspen Esteban reported a recent onset of lumbago and intermittent bilateral LE radiculopathy in weight bearing postures. A recent MRI revealed lumbar DDD and stenosis, and she has been referred to physical therapy in an effort to decrease her symptoms. PROBLEM LIST (Impacting functional limitations):  1. Decreased Strength  2. Decreased ADL/Functional Activities  3. Increased Pain  4. Decreased Activity Tolerance INTERVENTIONS PLANNED:  1. Home Exercise Program (HEP)  2. Neuromuscular Re-education/Strengthening  3. Range of Motion (ROM)  4. Therapeutic Exercise/Strengthening   TREATMENT PLAN:  Effective Dates: 3/26/2019 TO 2019. Frequency/Duration: 2 times a week for 90 days  GOALS: (Goals have been discussed and agreed upon with patient.)  Short-Term Functional Goals: Time Frame: 30 days. 1. Decrease Oswestry Index 4 points to allow improved ADLs with sitting and lifting. 2. Decrease AROM limits to moderate to decrease pain 1-2 levels. 3. Increase lumbar strength to decrease pain 1-2 levels. Discharge Goals: Time Frame: 90 days. .  1. Decrease Oswestry Index 8 points to allow progression to an independent home exercise program.  2. Decrease trunk AROM limits to minimal to decrease pain +2 levels. 3. Demonstrate independence in home exercises to allow DC from physical therapy. Rehabilitation Potential For Stated Goals: Good  Regarding Pamela Chaudhari's therapy, I certify that the treatment plan above will be carried out by a therapist or under their direction. Thank you for this referral,  Antelmo Son, PT                 The information in this section was collected on 3/26/2019 (except where otherwise noted). HISTORY:   History of Present Injury/Illness (Reason for Referral): The patient reported a recent onset of lumbago and intermittent bilateral LE radiculopathy in weight bearing postures. A recent MRI revealed lumbar DDD and stenosis, and she has been referred to physical therapy in an effort to decrease her symptoms. Past Medical History/Comorbidities:   Ms. Omer Mccullough  has a past medical history of Allergic rhinitis, cause unspecified, Anxiety, Arthritis, Asthma, Cancer ([address] (12/2014), Depression, Dyslipidemia, Former smoker, GERD (gastroesophageal reflux disease), Hyperglycemia, Hypertension, Subacute thyroiditis (2017), Thyroid cancer ([address] and Unspecified sleep apnea. She also has no past medical history of Adverse effect of anesthesia, Aneurysm ([address] Arrhythmia, Autoimmune disease ([address] CAD (coronary artery disease), Chronic kidney disease, Chronic obstructive pulmonary disease ([address] Coagulation disorder ([address] Difficult intubation, Endocarditis, Heart failure ([address] Ill-defined condition, Liver disease, Malignant hyperthermia due to anesthesia, Nausea & vomiting, Nicotine vapor product user, Non-nicotine vapor product user, Pseudocholinesterase deficiency, PUD (peptic ulcer disease), Rheumatic fever, Seizures ([address] Stroke ([address] or Thromboembolus ([address]   Ms. Omer Mccullough  has a past surgical history that includes hx breast reduction (Bilateral, 2006); hx tonsillectomy (2000); hx adenoidectomy; hx orthopaedic (Right, 2014); hx orthopaedic (Bilateral); hx orthopaedic (Left); hx gastrectomy (2015); hx bunionectomy (Right, 2013); hx other surgical (1988/2002,2013); hx other surgical (Right, 1976); hx bunionectomy (Left, 2012); hx jory and bso (2000); hx colonoscopy (2012); hx other surgical (Right, 2017); and hx thyroidectomy (2018). Social History/Living Environment:    Independent  Prior Level of Function/Work/Activity:  Retired  Dominant Side:         RIGHT  Current Medications:       Current Outpatient Medications:     levothyroxine (SYNTHROID) 100 mcg tablet, Take 1 Tab by mouth Daily (before breakfast). , Disp: 90 Tab, Rfl: 3    montelukast (SINGULAIR) 10 mg tablet, Take 1 Tab by mouth daily. , Disp: 90 Tab, Rfl: 3    omeprazole (PRILOSEC) 20 mg capsule, Take 1 Cap by mouth two (2) times a day., Disp: 180 Cap, Rfl: 3    PARoxetine (PAXIL) 10 mg tablet, Take 1 Tab by mouth daily. , Disp: 90 Tab, Rfl: 1    varicella-zoster recombinant, PF, (SHINGRIX, PF,) 50 mcg/0.5 mL susr injection, 0.5mL by IntraMUSCular route once now and then repeat in 2-6 months, Disp: 0.5 mL, Rfl: 1    B.infantis-B.ani-B.long-B.bifi (PROBIOTIC 4X) 10-15 mg TbEC, Take 1 Cap by mouth two (2) times a day., Disp: , Rfl:     aspirin delayed-release 81 mg tablet, Take 81 mg by mouth daily. Take / use AM day of surgery  per anesthesia protocols. , Disp: , Rfl:     multivitamin (ONE A DAY) tablet, Take 1 tablet by mouth daily. , Disp: , Rfl:     cholecalciferol (VITAMIN D3) 1,000 unit tablet, Take 1,000 Units by mouth daily. , Disp: , Rfl:     biotin 2,500 mcg tab, Take 1 Tab by mouth two (2) times a day., Disp: , Rfl:    Date Last Reviewed:  4/4/2019   Number of Personal Factors/Comorbidities that affect the Plan of Care: 3+: HIGH COMPLEXITY   EXAMINATION:   Observation/Orthostatic Postural Assessment:    Mildly kyphotic. Palpation:  Tenderness with palpation to the patient's lumbar spine.   ROM: TRUNK AROM   Extension     Severe Limit   Flexion Minimal Limit   Rotations      Moderate Limit                 Strength: LUMBAR SPINE   +3/5 abdominal strength   -4/5 R LE Strength    4/5 L LE Strength           Special Tests: N/A  Neurological Screen: SLR negative (B), but does experience bilateral LE radiculopathy with prolonged weight bearing activities. Functional Mobility: Independent   Balance:  Good. Body Structures Involved:  1. Bones  2. Joints  3. Muscles Body Functions Affected:  1. Sensory/Pain  2. Neuromusculoskeletal  3. Movement Related Activities and Participation Affected:  1. General Tasks and Demands  2. Mobility  3. Community, Social and [place]   Number of elements (examined above) that affect the Plan of Care: 4+: HIGH COMPLEXITY   CLINICAL PRESENTATION:   Presentation: Evolving clinical presentation with changing clinical characteristics: MODERATE COMPLEXITY   CLINICAL DECISION MAKING:   Outcome Measure: Tool Used: Modified Oswestry Low Back Pain Questionnaire  Score:  Initial: 14/50  Most Recent: X/50 (Date: -- )   Interpretation of Score: Each section is scored on a 0-5 scale, 5 representing the greatest disability. The scores of each section are added together for a total score of 50. Medical Necessity:   · Patient demonstrates good rehab potential due to higher previous functional level. Reason for Services/Other Comments:  · Patient continues to require skilled intervention due to her limited ability to perform weight bearing ADLs due to lumbago and bilateral sciatica. .   Use of outcome tool(s) and clinical judgement create a POC that gives a: Questionable prediction of patient's progress: MODERATE COMPLEXITY            TREATMENT:   (In addition to Assessment/Re-Assessment sessions the following treatments were rendered)  Pre-treatment Symptoms/Complaints:  The patient continues to be encouraged with her therapy. Pain: Initial:   Pain Intensity 1: 6  Post Session:  4   Therapeutic Exercise: ( 55):   The patient received treatment as below to improve mobility, strength and balance. Required moderate verbal and manual cues to promote proper body alignment, promote proper body posture and promote proper body mechanics. Progressed resistance, range and repetitions as indicated. The patient received exercise instruction followed by patient demonstration of the exercises to include abdominals in supine to include single hip and knee extension, and posterior pelvic tilts. Also hip flexor flexibility exercises to stabilize the lumbar spine and improved AROM to decrease pain and spasms allowing improved function. Date:  3/28/2019 Date:  4/2/2019 Date:  4/4/2019   Activity/Exercise Parameters Parameters Parameters   MAT  EXERCISES:      Hip ER / Abduction      Hip Adduction (Ball Squeeze)      Bridging      Straight Leg Raise      SAQ      Hip Abduction (Sidelying)      Gluteus Medius (Clam Shell)      Sit To Stand            STANDING HIP MACHINE:      Hip Abduction 12@ 2 x 10(B) 12@ 2 x 10(B) 18@ 2 x 10(B)   Hip Flexion 12@ 2 x 10(B) 12@ 2 x 10(B) 18@ 2 x 10(B)   Hip Extension 25@ 2 x 10(B) 25@ 2 x 10(B) 32@ 2 x 10(B)   Hip Adduction      Standing Knee Extension      4 Way Cable Walkout      Gluteus Medius (Crab Walk)      Knee Extension  10@ 2 x 10(B) 15@ 2 x 10(B)   Knee Flexion  15@ 2 x 10(B) 15@ 2 x 10(B)   Step Downs            Stationary Bike      Treadmill      NuStep 10 min 15 min 15 min         FLEXIBILITY:      Heel Cord      Hamstring 10 min 10 min 10 min   Hip Flexors 10 min 10 min 10 min   Trunk Rotators 10 min 10 min 10 min     [facility] Portal  Evaluation: (  )  Manual Therapy (     ):   Therapeutic Modalities:                                                                                               HEP: As above; handouts given to patient for all exercises. Treatment/Session Assessment:    · Response to Treatment: The patient tolerated today's treatment without symptom exacerbation.   The patient demonstrated good exercise tolerance to the exercises with increased weights and required manual and verbal cuing for proper technique The patient should progress to an independent home exercise program within a few weeks  · Compliance with Program/Exercises: Will assess as treatment progresses. · Recommendations/Intent for next treatment session: \"Next visit will focus on advancements to more challenging activities\".   Total Treatment Duration:  PT Patient Time In/Time Out  Time In: 1300  Time Out: [address] PT

## 2019-04-09 ENCOUNTER — HOSPITAL ENCOUNTER (OUTPATIENT)
Dept: PHYSICAL THERAPY | Age: 69
Discharge: HOME | End: 2019-04-09
Payer: MEDICARE

## 2019-04-09 PROCEDURE — 97110 THERAPEUTIC EXERCISES: CPT

## 2019-04-09 NOTE — PROGRESS NOTES
Gil Patel  : 1950  [place] / [place] / Product Type: Medicare /  [address]  at [place] [id number] [address], [address]  [phone]   [phone]      OUTPATIENT PHYSICAL THERAPY:Daily Note 2019    ICD-10: Treatment Diagnosis:   M54.41 Lumbago with sciatica, right side     M54.42 Lumbago with sciatica, left side   Precautions/Allergies:   Hydrocodone-acetaminophen and Sulfa (sulfonamide antibiotics)   Fall Risk Score: 0 (? 5 = High Risk)  MD Orders: Evaluate and treat. MEDICAL/REFERRING DIAGNOSIS:  Spinal stenosis, lumbar region with neurogenic claudication [M48.062]   DATE OF ONSET: 2019  REFERRING PHYSICIAN: Ce Garcia PA  RETURN PHYSICIAN APPOINTMENT: TBD     INITIAL ASSESSMENT:  Ms. Shereen Tyson reported a recent onset of lumbago and intermittent bilateral LE radiculopathy in weight bearing postures. A recent MRI revealed lumbar DDD and stenosis, and she has been referred to physical therapy in an effort to decrease her symptoms. PROBLEM LIST (Impacting functional limitations):  1. Decreased Strength  2. Decreased ADL/Functional Activities  3. Increased Pain  4. Decreased Activity Tolerance INTERVENTIONS PLANNED:  1. Home Exercise Program (HEP)  2. Neuromuscular Re-education/Strengthening  3. Range of Motion (ROM)  4. Therapeutic Exercise/Strengthening   TREATMENT PLAN:  Effective Dates: 3/26/2019 TO 2019. Frequency/Duration: 2 times a week for 90 days  GOALS: (Goals have been discussed and agreed upon with patient.)  Short-Term Functional Goals: Time Frame: 30 days. 1. Decrease Oswestry Index 4 points to allow improved ADLs with sitting and lifting. 2. Decrease AROM limits to moderate to decrease pain 1-2 levels. 3. Increase lumbar strength to decrease pain 1-2 levels. Discharge Goals: Time Frame: 90 days. .  1. Decrease Oswestry Index 8 points to allow progression to an independent home exercise program.  2. Decrease trunk AROM limits to minimal to decrease pain +2 levels. 3. Demonstrate independence in home exercises to allow DC from physical therapy. Rehabilitation Potential For Stated Goals: Good  Regarding Jelly Chaudhari's therapy, I certify that the treatment plan above will be carried out by a therapist or under their direction. Thank you for this referral,  Laura Pruett., PT                 The information in this section was collected on 3/26/2019 (except where otherwise noted). HISTORY:   History of Present Injury/Illness (Reason for Referral): The patient reported a recent onset of lumbago and intermittent bilateral LE radiculopathy in weight bearing postures. A recent MRI revealed lumbar DDD and stenosis, and she has been referred to physical therapy in an effort to decrease her symptoms. Past Medical History/Comorbidities:   Ms. Rachel Scott  has a past medical history of Allergic rhinitis, cause unspecified, Anxiety, Arthritis, Asthma, Cancer ([address] (12/2014), Depression, Dyslipidemia, Former smoker, GERD (gastroesophageal reflux disease), Hyperglycemia, Hypertension, Subacute thyroiditis (2017), Thyroid cancer ([address] and Unspecified sleep apnea. She also has no past medical history of Adverse effect of anesthesia, Aneurysm ([address] Arrhythmia, Autoimmune disease ([address] CAD (coronary artery disease), Chronic kidney disease, Chronic obstructive pulmonary disease ([address] Coagulation disorder ([address] Difficult intubation, Endocarditis, Heart failure ([address] Ill-defined condition, Liver disease, Malignant hyperthermia due to anesthesia, Nausea & vomiting, Nicotine vapor product user, Non-nicotine vapor product user, Pseudocholinesterase deficiency, PUD (peptic ulcer disease), Rheumatic fever, Seizures ([address] Stroke ([address] or Thromboembolus ([address]   Ms. Rachel Scott  has a past surgical history that includes hx breast reduction (Bilateral, 2006); hx tonsillectomy (2000); hx adenoidectomy; hx orthopaedic (Right, 2014); hx orthopaedic (Bilateral); hx orthopaedic (Left); hx gastrectomy (2015); hx bunionectomy (Right, 2013); hx other surgical (1988/2002,2013); hx other surgical (Right, 1976); hx bunionectomy (Left, 2012); hx jory and bso (2000); hx colonoscopy (2012); hx other surgical (Right, 2017); and hx thyroidectomy (2018). Social History/Living Environment:    Independent  Prior Level of Function/Work/Activity:  Retired  Dominant Side:         RIGHT  Current Medications:       Current Outpatient Medications:     levothyroxine (SYNTHROID) 100 mcg tablet, Take 1 Tab by mouth Daily (before breakfast). , Disp: 90 Tab, Rfl: 3    montelukast (SINGULAIR) 10 mg tablet, Take 1 Tab by mouth daily. , Disp: 90 Tab, Rfl: 3    omeprazole (PRILOSEC) 20 mg capsule, Take 1 Cap by mouth two (2) times a day., Disp: 180 Cap, Rfl: 3    PARoxetine (PAXIL) 10 mg tablet, Take 1 Tab by mouth daily. , Disp: 90 Tab, Rfl: 1    varicella-zoster recombinant, PF, (SHINGRIX, PF,) 50 mcg/0.5 mL susr injection, 0.5mL by IntraMUSCular route once now and then repeat in 2-6 months, Disp: 0.5 mL, Rfl: 1    B.infantis-B.ani-B.long-B.bifi (PROBIOTIC 4X) 10-15 mg TbEC, Take 1 Cap by mouth two (2) times a day., Disp: , Rfl:     aspirin delayed-release 81 mg tablet, Take 81 mg by mouth daily. Take / use AM day of surgery  per anesthesia protocols. , Disp: , Rfl:     multivitamin (ONE A DAY) tablet, Take 1 tablet by mouth daily. , Disp: , Rfl:     cholecalciferol (VITAMIN D3) 1,000 unit tablet, Take 1,000 Units by mouth daily. , Disp: , Rfl:     biotin 2,500 mcg tab, Take 1 Tab by mouth two (2) times a day., Disp: , Rfl:    Date Last Reviewed:  4/9/2019   Number of Personal Factors/Comorbidities that affect the Plan of Care: 3+: HIGH COMPLEXITY   EXAMINATION:   Observation/Orthostatic Postural Assessment:    Mildly kyphotic. Palpation:  Tenderness with palpation to the patient's lumbar spine.   ROM: TRUNK AROM   Extension     Severe Limit   Flexion Minimal Limit   Rotations      Moderate Limit                 Strength: LUMBAR SPINE   +3/5 abdominal strength   -4/5 R LE Strength    4/5 L LE Strength           Special Tests: N/A  Neurological Screen: SLR negative (B), but does experience bilateral LE radiculopathy with prolonged weight bearing activities. Functional Mobility: Independent   Balance:  Good. Body Structures Involved:  1. Bones  2. Joints  3. Muscles Body Functions Affected:  1. Sensory/Pain  2. Neuromusculoskeletal  3. Movement Related Activities and Participation Affected:  1. General Tasks and Demands  2. Mobility  3. Community, Social and [place]   Number of elements (examined above) that affect the Plan of Care: 4+: HIGH COMPLEXITY   CLINICAL PRESENTATION:   Presentation: Evolving clinical presentation with changing clinical characteristics: MODERATE COMPLEXITY   CLINICAL DECISION MAKING:   Outcome Measure: Tool Used: Modified Oswestry Low Back Pain Questionnaire  Score:  Initial: 14/50  Most Recent: X/50 (Date: -- )   Interpretation of Score: Each section is scored on a 0-5 scale, 5 representing the greatest disability. The scores of each section are added together for a total score of 50. Medical Necessity:   · Patient demonstrates good rehab potential due to higher previous functional level. Reason for Services/Other Comments:  · Patient continues to require skilled intervention due to her limited ability to perform weight bearing ADLs due to lumbago and bilateral sciatica. .   Use of outcome tool(s) and clinical judgement create a POC that gives a: Questionable prediction of patient's progress: MODERATE COMPLEXITY            TREATMENT:   (In addition to Assessment/Re-Assessment sessions the following treatments were rendered)  Pre-treatment Symptoms/Complaints:  The patient reported that she visited with her doctor and he recommends a laminectomy. She is open to the idea but her husbands back may take precedence.   Pain: Initial:   Pain Intensity 1: 6  Post Session:  4   Therapeutic Exercise: ( 55): The patient received treatment as below to improve mobility, strength and balance. Required moderate verbal and manual cues to promote proper body alignment, promote proper body posture and promote proper body mechanics. Progressed resistance, range and repetitions as indicated. The patient received exercise instruction followed by patient demonstration of the exercises to include abdominals in supine to include single hip and knee extension, and posterior pelvic tilts. Also hip flexor flexibility exercises to stabilize the lumbar spine and improved AROM to decrease pain and spasms allowing improved function.    Date:  3/28/2019 Date:  4/2/2019 Date:  4/4/2019 Date:  4/9/2019     Activity/Exercise Parameters Parameters Parameters      MAT  EXERCISES:         Hip ER / Abduction         Hip Adduction (Ball Squeeze)         Bridging         Straight Leg Raise         SAQ         Hip Abduction (Sidelying)         Gluteus Medius (Clam Shell)         Sit To Stand                  STANDING HIP MACHINE:         Hip Abduction 12@ 2 x 10(B) 12@ 2 x 10(B) 18@ 2 x 10(B) 18@ 3 x 10(B)     Hip Flexion 12@ 2 x 10(B) 12@ 2 x 10(B) 18@ 2 x 10(B) 18@ 3 x 10(B)     Hip Extension 25@ 2 x 10(B) 25@ 2 x 10(B) 32@ 2 x 10(B) 32@ 3 x 10(B)     Hip Adduction         Standing Knee Extension         4 Way Cable Walkout         Gluteus Medius (Crab Walk)         Knee Extension  10@ 2 x 10(B) 15@ 2 x 10(B) 15@ 3 x 10(B)     Knee Flexion  15@ 2 x 10(B) 15@ 2 x 10(B) 15@ 3 x 10(B)     Step Downs                  Stationary Bike         Treadmill         NuStep 10 min 15 min 15 min 15 min              FLEXIBILITY:         Heel Cord         Hamstring 10 min 10 min 10 min 10 min     Hip Flexors 10 min 10 min 10 min 10 min     Trunk Rotators 10 min 10 min 10 min 10 min       [facility]  Evaluation: (  )  Manual Therapy (     ):   Therapeutic Modalities: HEP: As above; handouts given to patient for all exercises. Treatment/Session Assessment:    · Response to Treatment: The patient tolerated today's treatment without symptom exacerbation. The patient demonstrated good exercise tolerance to the exercises with increased sets and required manual and verbal cuing for proper technique The patient is considering back surgery at this time. · Compliance with Program/Exercises: Will assess as treatment progresses. · Recommendations/Intent for next treatment session: \"Next visit will focus on advancements to more challenging activities\".   Total Treatment Duration:  PT Patient Time In/Time Out  Time In: 1300  Time Out: [address] PT

## 2019-04-11 ENCOUNTER — HOSPITAL ENCOUNTER (OUTPATIENT)
Dept: PHYSICAL THERAPY | Age: 69
Discharge: HOME | End: 2019-04-11
Payer: MEDICARE

## 2019-04-11 NOTE — PROGRESS NOTES
Leodan Abbasi  : 1950  [place]  Secondary: [address] at [address], [address] [place] [address] Rd  [phone]   [phone]      OUTPATIENT DAILY NOTE    NAME/AGE/GENDER: Leodan Abbasi is a 70 y.o. female. DATE: 2019    Patient cancelled  appointment today due to schedule conflicit. Will plan to follow up on next scheduled visit.     Jairo James, PTA

## 2019-04-25 RX ORDER — CEFAZOLIN SODIUM/WATER 2 G/20 ML
2 SYRINGE (ML) INTRAVENOUS ONCE
Status: CANCELLED | OUTPATIENT
Start: 2019-04-25 | End: 2019-04-26

## 2019-05-08 ENCOUNTER — HOSPITAL ENCOUNTER (OUTPATIENT)
Dept: SURGERY | Age: 69
Discharge: HOME | End: 2019-05-08
Payer: MEDICARE

## 2019-05-08 VITALS
WEIGHT: 157 LBS | BODY MASS INDEX: 26.16 KG/M2 | OXYGEN SATURATION: 94 % | HEART RATE: 70 BPM | RESPIRATION RATE: 16 BRPM | TEMPERATURE: 98.1 F | HEIGHT: 65 IN | DIASTOLIC BLOOD PRESSURE: 79 MMHG | SYSTOLIC BLOOD PRESSURE: 150 MMHG

## 2019-05-08 LAB
BACTERIA SPEC CULT: NORMAL
SERVICE CMNT-IMP: NORMAL

## 2019-05-08 PROCEDURE — 87641 MR-STAPH DNA AMP PROBE: CPT

## 2019-05-08 PROCEDURE — 77030027138 HC INCENT SPIROMETER -A

## 2019-05-08 RX ORDER — OMEPRAZOLE 10 MG/1
10 CAPSULE, DELAYED RELEASE ORAL DAILY
COMMUNITY

## 2019-05-08 RX ORDER — DIPHENHYDRAMINE HCL 25 MG
25 CAPSULE ORAL
COMMUNITY
End: 2021-02-22

## 2019-05-08 NOTE — PERIOP NOTES
Patient verified name, , and surgery as listed in [facility]. Patient provided medical/health information and PTA medications to the best of their ability. TYPE  CASE: ll  Orders per surgeon: were received and dated    Labs per surgeon: MRSA. Labs per anesthesia protocol: cbc, cmp and urinalysis done by Dr Wills Prior on 2019 brought by patient and placed in chart and acceptable per anesthesia protocol. EKG:  Last ekg was 2018, in care everywhere, patient denies any hx of CP, ALBERTO or CAD     Nasal Swab collected per MD order and instructions for Mupirocin nasal ointment if required. Patient provided with and instructed on education handouts including Guide to Surgery, blood transfusions, pain management, and hand hygiene for the family and community, and [facility] brochure. Road to Recovery Spine surgery patient guide given. Instructed on incentive spirometry with return demonstration. Long handled prehab sponge given with instructions for use. Patient viewed spine prehab video. Hibiclens and instructions given per hospital policy. Instructed patient to continue previous medications as prescribed prior to surgery unless otherwise directed and to take the following medications the day of surgery according to anesthesia guidelines : levothyroxine, omeprazole, paroxitne . Instructed patient to hold  the following medications on the day of surgery: vitamins. Original medication prescription bottles were visualized during patient appointment. Patient teach back successful and patient demonstrates knowledge of instruction.

## 2019-05-10 NOTE — H&P
Allergies:Sulfa Medications:Aspirin (81 MG); Benadryl;Biotin;Levothyroxine Sodium (100 MCG); Montelukast Sodium (10 MG);Multivitamin; Omeprazole (20 MG);PARoxetine HCl (10 MG); Super B-Complex; Vitamin D3 
 
CC: LOW BACK AND LEG PAIN RECHECK 
 
HPI:  The patient is 78years-old. She has spinal stenosis at L3-4 and L4-5, but it is worse on the right side. The stenosis at L4-5 is worse. At L5-S1, she has lateral recess stenosis on the left and some L5 foraminal narrowing. I think there is some bulging disc. An L4-5 epidural block gave her 80% relief and it really helped the hip pain that she had and she thought she had arthritic hips. We then tried a right-sided L3 and L4 selective nerve root block, but she fell shortly after that and had issues from the fall so we were never really sure how much it helped. She has been thinking about it and she is in good shape now. She wants to go ahead and possibly do surgery. We reviewed the scan and I told I thought she would need L3 to S1 laminectomy. At S1, basically mainly do the left side. At L3-4 and L4-5, concentrate on the right side. Went through her medical history and really she is pretty healthy. She is nondiabetic. She had thyroid cancer last year that was treated surgically. Otherwise no major medical issues. The only blood thinner is aspirin,. She does not have stents in the heart. She does not smoke. I talked about the surgery in detail. I told her it would be a 1 or 2 night stay and have her do a lot of working once she gets home and do therapy somewhat about 4 weeks after surgery. Went through risks, including death, paralysis, infection, bleeding, transfusion, heart attack, stroke, clot in leg, clot in lung, dural tear, recurrent back problems and the fact I cannot guarantee pain relief. She is concerned about if we did the decompression would she need a fusion down the road. I told her I could not guarantee, but I am not anticipating that.   I answered all of her questions and she would like to proceed. I went ahead and examined her. PE:  She is a healthy-appearing  female with no gross deformities alert and oriented x3. Her pupils are equal, round and reactive to light. Oropharynx clear. Neck is without adenopathy or bruit. Her lungs were clear to auscultation bilaterally. Heart is regular rate and rhythm without murmur. Her abdomen is soft and nontender. No hepatosplenomegaly. ASSESSMENT/PLAN:  The patient has spinal stenosis with back and leg pain and claudication symptoms. Very limited on standing and walking. We would do an L3 to S1 laminectomy.    
 
 
Electronically Signed By Gabriela Trinidad MD

## 2019-05-15 ENCOUNTER — ANESTHESIA EVENT (OUTPATIENT)
Dept: SURGERY | Age: 69
End: 2019-05-15
Payer: MEDICARE

## 2019-05-16 ENCOUNTER — APPOINTMENT (OUTPATIENT)
Dept: GENERAL RADIOLOGY | Age: 69
End: 2019-05-16
Attending: ORTHOPAEDIC SURGERY
Payer: MEDICARE

## 2019-05-16 ENCOUNTER — HOSPITAL ENCOUNTER (OUTPATIENT)
Age: 69
Setting detail: OBSERVATION
Discharge: HOME | End: 2019-05-19
Attending: ORTHOPAEDIC SURGERY | Admitting: ORTHOPAEDIC SURGERY
Payer: MEDICARE

## 2019-05-16 ENCOUNTER — ANESTHESIA (OUTPATIENT)
Dept: SURGERY | Age: 69
End: 2019-05-16
Payer: MEDICARE

## 2019-05-16 DIAGNOSIS — M48.061 SPINAL STENOSIS OF LUMBAR REGION AT MULTIPLE LEVELS: Primary | ICD-10-CM

## 2019-05-16 PROBLEM — M48.00 SPINAL STENOSIS: Status: ACTIVE | Noted: 2019-05-16

## 2019-05-16 PROCEDURE — 77030003028 HC SUT VCRL J&J -A: Performed by: ORTHOPAEDIC SURGERY

## 2019-05-16 PROCEDURE — 77030032490 HC SLV COMPR SCD KNE COVD -B: Performed by: ORTHOPAEDIC SURGERY

## 2019-05-16 PROCEDURE — 74011250637 HC RX REV CODE- 250/637: Performed by: ANESTHESIOLOGY

## 2019-05-16 PROCEDURE — 74011250636 HC RX REV CODE- 250/636: Performed by: ORTHOPAEDIC SURGERY

## 2019-05-16 PROCEDURE — 74011250636 HC RX REV CODE- 250/636: Performed by: ANESTHESIOLOGY

## 2019-05-16 PROCEDURE — 72020 X-RAY EXAM OF SPINE 1 VIEW: CPT

## 2019-05-16 PROCEDURE — 77030039425 HC BLD LARYNG TRULITE DISP TELE -A: Performed by: ANESTHESIOLOGY

## 2019-05-16 PROCEDURE — 77030027138 HC INCENT SPIROMETER -A

## 2019-05-16 PROCEDURE — 99218 HC RM OBSERVATION: CPT

## 2019-05-16 PROCEDURE — 74011250636 HC RX REV CODE- 250/636

## 2019-05-16 PROCEDURE — 77030025623 HC BUR RND PRECIS STRY -D: Performed by: ORTHOPAEDIC SURGERY

## 2019-05-16 PROCEDURE — 77030037088 HC TUBE ENDOTRACH ORAL NSL COVD-A: Performed by: ANESTHESIOLOGY

## 2019-05-16 PROCEDURE — 77030031139 HC SUT VCRL2 J&J -A: Performed by: ORTHOPAEDIC SURGERY

## 2019-05-16 PROCEDURE — 74011000250 HC RX REV CODE- 250: Performed by: ORTHOPAEDIC SURGERY

## 2019-05-16 PROCEDURE — 77030014647 HC SEAL FBRN TISSL BAXT -D: Performed by: ORTHOPAEDIC SURGERY

## 2019-05-16 PROCEDURE — 77030018836 HC SOL IRR NACL ICUM -A: Performed by: ORTHOPAEDIC SURGERY

## 2019-05-16 PROCEDURE — 76060000035 HC ANESTHESIA 2 TO 2.5 HR: Performed by: ORTHOPAEDIC SURGERY

## 2019-05-16 PROCEDURE — 77030019908 HC STETH ESOPH SIMS -A: Performed by: ANESTHESIOLOGY

## 2019-05-16 PROCEDURE — 77030019940 HC BLNKT HYPOTHRM STRY -B: Performed by: ANESTHESIOLOGY

## 2019-05-16 PROCEDURE — 76010000171 HC OR TIME 2 TO 2.5 HR INTENSV-TIER 1: Performed by: ORTHOPAEDIC SURGERY

## 2019-05-16 PROCEDURE — 77030012894: Performed by: ORTHOPAEDIC SURGERY

## 2019-05-16 PROCEDURE — 74011250637 HC RX REV CODE- 250/637: Performed by: ORTHOPAEDIC SURGERY

## 2019-05-16 PROCEDURE — 77030012406 HC DRN WND PENRS BARD -A: Performed by: ORTHOPAEDIC SURGERY

## 2019-05-16 PROCEDURE — 77030029099 HC BN WAX SSPC -A: Performed by: ORTHOPAEDIC SURGERY

## 2019-05-16 PROCEDURE — 76210000016 HC OR PH I REC 1 TO 1.5 HR: Performed by: ORTHOPAEDIC SURGERY

## 2019-05-16 PROCEDURE — 74011000250 HC RX REV CODE- 250

## 2019-05-16 PROCEDURE — 74011000272 HC RX REV CODE- 272: Performed by: ORTHOPAEDIC SURGERY

## 2019-05-16 RX ORDER — ROCURONIUM BROMIDE 10 MG/ML
INJECTION, SOLUTION INTRAVENOUS AS NEEDED
Status: DISCONTINUED | OUTPATIENT
Start: 2019-05-16 | End: 2019-05-16 | Stop reason: HOSPADM

## 2019-05-16 RX ORDER — ONDANSETRON 2 MG/ML
4 INJECTION INTRAMUSCULAR; INTRAVENOUS
Status: DISCONTINUED | OUTPATIENT
Start: 2019-05-16 | End: 2019-05-19 | Stop reason: HOSPADM

## 2019-05-16 RX ORDER — SODIUM CHLORIDE 0.9 % (FLUSH) 0.9 %
5-40 SYRINGE (ML) INJECTION AS NEEDED
Status: DISCONTINUED | OUTPATIENT
Start: 2019-05-16 | End: 2019-05-16 | Stop reason: HOSPADM

## 2019-05-16 RX ORDER — SODIUM CHLORIDE 0.9 % (FLUSH) 0.9 %
5-40 SYRINGE (ML) INJECTION EVERY 8 HOURS
Status: DISCONTINUED | OUTPATIENT
Start: 2019-05-16 | End: 2019-05-19 | Stop reason: HOSPADM

## 2019-05-16 RX ORDER — FENTANYL CITRATE 50 UG/ML
100 INJECTION, SOLUTION INTRAMUSCULAR; INTRAVENOUS ONCE
Status: DISCONTINUED | OUTPATIENT
Start: 2019-05-16 | End: 2019-05-16 | Stop reason: HOSPADM

## 2019-05-16 RX ORDER — IBUPROFEN 400 MG/1
400 TABLET ORAL
Status: DISCONTINUED | OUTPATIENT
Start: 2019-05-16 | End: 2019-05-19 | Stop reason: HOSPADM

## 2019-05-16 RX ORDER — DEXTROSE, SODIUM CHLORIDE, AND POTASSIUM CHLORIDE 5; .45; .15 G/100ML; G/100ML; G/100ML
100 INJECTION INTRAVENOUS CONTINUOUS
Status: DISCONTINUED | OUTPATIENT
Start: 2019-05-16 | End: 2019-05-19 | Stop reason: HOSPADM

## 2019-05-16 RX ORDER — SODIUM CHLORIDE, SODIUM LACTATE, POTASSIUM CHLORIDE, CALCIUM CHLORIDE 600; 310; 30; 20 MG/100ML; MG/100ML; MG/100ML; MG/100ML
100 INJECTION, SOLUTION INTRAVENOUS CONTINUOUS
Status: DISCONTINUED | OUTPATIENT
Start: 2019-05-16 | End: 2019-05-16 | Stop reason: HOSPADM

## 2019-05-16 RX ORDER — PANTOPRAZOLE SODIUM 40 MG/1
40 TABLET, DELAYED RELEASE ORAL
Status: DISCONTINUED | OUTPATIENT
Start: 2019-05-17 | End: 2019-05-19 | Stop reason: HOSPADM

## 2019-05-16 RX ORDER — SODIUM CHLORIDE 0.9 % (FLUSH) 0.9 %
5-40 SYRINGE (ML) INJECTION AS NEEDED
Status: DISCONTINUED | OUTPATIENT
Start: 2019-05-16 | End: 2019-05-19 | Stop reason: HOSPADM

## 2019-05-16 RX ORDER — PAROXETINE HYDROCHLORIDE 20 MG/1
10 TABLET, FILM COATED ORAL DAILY
Status: DISCONTINUED | OUTPATIENT
Start: 2019-05-17 | End: 2019-05-19 | Stop reason: HOSPADM

## 2019-05-16 RX ORDER — LIDOCAINE HYDROCHLORIDE 20 MG/ML
INJECTION, SOLUTION EPIDURAL; INFILTRATION; INTRACAUDAL; PERINEURAL AS NEEDED
Status: DISCONTINUED | OUTPATIENT
Start: 2019-05-16 | End: 2019-05-16 | Stop reason: HOSPADM

## 2019-05-16 RX ORDER — GLYCOPYRROLATE 0.2 MG/ML
INJECTION INTRAMUSCULAR; INTRAVENOUS AS NEEDED
Status: DISCONTINUED | OUTPATIENT
Start: 2019-05-16 | End: 2019-05-16 | Stop reason: HOSPADM

## 2019-05-16 RX ORDER — EPHEDRINE SULFATE 50 MG/ML
INJECTION, SOLUTION INTRAVENOUS AS NEEDED
Status: DISCONTINUED | OUTPATIENT
Start: 2019-05-16 | End: 2019-05-16 | Stop reason: HOSPADM

## 2019-05-16 RX ORDER — HYDROMORPHONE HYDROCHLORIDE 1 MG/ML
1 INJECTION, SOLUTION INTRAMUSCULAR; INTRAVENOUS; SUBCUTANEOUS
Status: DISCONTINUED | OUTPATIENT
Start: 2019-05-16 | End: 2019-05-19 | Stop reason: HOSPADM

## 2019-05-16 RX ORDER — SODIUM CHLORIDE 0.9 % (FLUSH) 0.9 %
5-40 SYRINGE (ML) INJECTION EVERY 8 HOURS
Status: DISCONTINUED | OUTPATIENT
Start: 2019-05-16 | End: 2019-05-16 | Stop reason: HOSPADM

## 2019-05-16 RX ORDER — PROPOFOL 10 MG/ML
INJECTION, EMULSION INTRAVENOUS AS NEEDED
Status: DISCONTINUED | OUTPATIENT
Start: 2019-05-16 | End: 2019-05-16 | Stop reason: HOSPADM

## 2019-05-16 RX ORDER — DEXAMETHASONE SODIUM PHOSPHATE 4 MG/ML
INJECTION, SOLUTION INTRA-ARTICULAR; INTRALESIONAL; INTRAMUSCULAR; INTRAVENOUS; SOFT TISSUE AS NEEDED
Status: DISCONTINUED | OUTPATIENT
Start: 2019-05-16 | End: 2019-05-16 | Stop reason: HOSPADM

## 2019-05-16 RX ORDER — MIDAZOLAM HYDROCHLORIDE 1 MG/ML
2 INJECTION, SOLUTION INTRAMUSCULAR; INTRAVENOUS
Status: DISCONTINUED | OUTPATIENT
Start: 2019-05-16 | End: 2019-05-16 | Stop reason: HOSPADM

## 2019-05-16 RX ORDER — HYDROMORPHONE HYDROCHLORIDE 2 MG/ML
0.5 INJECTION, SOLUTION INTRAMUSCULAR; INTRAVENOUS; SUBCUTANEOUS
Status: DISCONTINUED | OUTPATIENT
Start: 2019-05-16 | End: 2019-05-16 | Stop reason: HOSPADM

## 2019-05-16 RX ORDER — DIPHENHYDRAMINE HCL 25 MG
25 CAPSULE ORAL
Status: DISCONTINUED | OUTPATIENT
Start: 2019-05-16 | End: 2019-05-19 | Stop reason: HOSPADM

## 2019-05-16 RX ORDER — LEVOTHYROXINE SODIUM 100 UG/1
100 TABLET ORAL
Status: DISCONTINUED | OUTPATIENT
Start: 2019-05-17 | End: 2019-05-19 | Stop reason: SDUPTHER

## 2019-05-16 RX ORDER — FAMOTIDINE 20 MG/1
20 TABLET, FILM COATED ORAL ONCE
Status: COMPLETED | OUTPATIENT
Start: 2019-05-16 | End: 2019-05-16

## 2019-05-16 RX ORDER — LIDOCAINE HYDROCHLORIDE 10 MG/ML
0.1 INJECTION INFILTRATION; PERINEURAL AS NEEDED
Status: DISCONTINUED | OUTPATIENT
Start: 2019-05-16 | End: 2019-05-16 | Stop reason: HOSPADM

## 2019-05-16 RX ORDER — FENTANYL CITRATE 50 UG/ML
INJECTION, SOLUTION INTRAMUSCULAR; INTRAVENOUS AS NEEDED
Status: DISCONTINUED | OUTPATIENT
Start: 2019-05-16 | End: 2019-05-16 | Stop reason: HOSPADM

## 2019-05-16 RX ORDER — CEFAZOLIN SODIUM/WATER 2 G/20 ML
2 SYRINGE (ML) INTRAVENOUS EVERY 8 HOURS
Status: COMPLETED | OUTPATIENT
Start: 2019-05-16 | End: 2019-05-17

## 2019-05-16 RX ORDER — CYCLOBENZAPRINE HCL 10 MG
5 TABLET ORAL
Status: DISCONTINUED | OUTPATIENT
Start: 2019-05-16 | End: 2019-05-19 | Stop reason: HOSPADM

## 2019-05-16 RX ORDER — SODIUM CHLORIDE 9 MG/ML
25 INJECTION, SOLUTION INTRAVENOUS CONTINUOUS
Status: DISCONTINUED | OUTPATIENT
Start: 2019-05-16 | End: 2019-05-16 | Stop reason: HOSPADM

## 2019-05-16 RX ORDER — BUPIVACAINE HYDROCHLORIDE AND EPINEPHRINE 5; 5 MG/ML; UG/ML
INJECTION, SOLUTION EPIDURAL; INTRACAUDAL; PERINEURAL AS NEEDED
Status: DISCONTINUED | OUTPATIENT
Start: 2019-05-16 | End: 2019-05-16 | Stop reason: HOSPADM

## 2019-05-16 RX ORDER — CEFAZOLIN SODIUM/WATER 2 G/20 ML
2 SYRINGE (ML) INTRAVENOUS ONCE
Status: COMPLETED | OUTPATIENT
Start: 2019-05-16 | End: 2019-05-16

## 2019-05-16 RX ORDER — OXYCODONE AND ACETAMINOPHEN 7.5; 325 MG/1; MG/1
1 TABLET ORAL
Status: DISCONTINUED | OUTPATIENT
Start: 2019-05-16 | End: 2019-05-19 | Stop reason: HOSPADM

## 2019-05-16 RX ORDER — OXYCODONE HYDROCHLORIDE 5 MG/1
5 TABLET ORAL ONCE
Status: COMPLETED | OUTPATIENT
Start: 2019-05-16 | End: 2019-05-16

## 2019-05-16 RX ORDER — VANCOMYCIN HYDROCHLORIDE 1 G/20ML
INJECTION, POWDER, LYOPHILIZED, FOR SOLUTION INTRAVENOUS AS NEEDED
Status: DISCONTINUED | OUTPATIENT
Start: 2019-05-16 | End: 2019-05-16 | Stop reason: HOSPADM

## 2019-05-16 RX ORDER — MONTELUKAST SODIUM 10 MG/1
10 TABLET ORAL DAILY
Status: DISCONTINUED | OUTPATIENT
Start: 2019-05-17 | End: 2019-05-19 | Stop reason: HOSPADM

## 2019-05-16 RX ORDER — OXYCODONE AND ACETAMINOPHEN 7.5; 325 MG/1; MG/1
1 TABLET ORAL
Qty: 28 TAB | Refills: 0 | Status: SHIPPED | OUTPATIENT
Start: 2019-05-16 | End: 2019-05-23

## 2019-05-16 RX ORDER — NALOXONE HYDROCHLORIDE 0.4 MG/ML
0.1 INJECTION, SOLUTION INTRAMUSCULAR; INTRAVENOUS; SUBCUTANEOUS AS NEEDED
Status: DISCONTINUED | OUTPATIENT
Start: 2019-05-16 | End: 2019-05-16 | Stop reason: HOSPADM

## 2019-05-16 RX ORDER — ONDANSETRON 2 MG/ML
INJECTION INTRAMUSCULAR; INTRAVENOUS AS NEEDED
Status: DISCONTINUED | OUTPATIENT
Start: 2019-05-16 | End: 2019-05-16 | Stop reason: HOSPADM

## 2019-05-16 RX ORDER — FAMOTIDINE 20 MG/1
20 TABLET, FILM COATED ORAL EVERY 12 HOURS
Status: DISCONTINUED | OUTPATIENT
Start: 2019-05-16 | End: 2019-05-19 | Stop reason: HOSPADM

## 2019-05-16 RX ORDER — NEOSTIGMINE METHYLSULFATE 1 MG/ML
INJECTION INTRAVENOUS AS NEEDED
Status: DISCONTINUED | OUTPATIENT
Start: 2019-05-16 | End: 2019-05-16 | Stop reason: HOSPADM

## 2019-05-16 RX ADMIN — ROCURONIUM BROMIDE 10 MG: 10 INJECTION, SOLUTION INTRAVENOUS at 13:26

## 2019-05-16 RX ADMIN — DEXTROSE MONOHYDRATE, SODIUM CHLORIDE, AND POTASSIUM CHLORIDE 100 ML/HR: 50; 4.5; 1.49 INJECTION, SOLUTION INTRAVENOUS at 18:31

## 2019-05-16 RX ADMIN — LIDOCAINE HYDROCHLORIDE 60 MG: 20 INJECTION, SOLUTION EPIDURAL; INFILTRATION; INTRACAUDAL; PERINEURAL at 13:04

## 2019-05-16 RX ADMIN — FAMOTIDINE 20 MG: 20 TABLET ORAL at 21:40

## 2019-05-16 RX ADMIN — SODIUM CHLORIDE, SODIUM LACTATE, POTASSIUM CHLORIDE, AND CALCIUM CHLORIDE: 600; 310; 30; 20 INJECTION, SOLUTION INTRAVENOUS at 13:01

## 2019-05-16 RX ADMIN — SODIUM CHLORIDE, SODIUM LACTATE, POTASSIUM CHLORIDE, AND CALCIUM CHLORIDE 100 ML/HR: 600; 310; 30; 20 INJECTION, SOLUTION INTRAVENOUS at 12:33

## 2019-05-16 RX ADMIN — ONDANSETRON 4 MG: 2 INJECTION INTRAMUSCULAR; INTRAVENOUS at 14:25

## 2019-05-16 RX ADMIN — Medication 5 ML: at 18:32

## 2019-05-16 RX ADMIN — EPHEDRINE SULFATE 10 MG: 50 INJECTION, SOLUTION INTRAVENOUS at 13:55

## 2019-05-16 RX ADMIN — GLYCOPYRROLATE 0.4 MG: 0.2 INJECTION INTRAMUSCULAR; INTRAVENOUS at 14:52

## 2019-05-16 RX ADMIN — Medication 3 AMPULE: at 12:07

## 2019-05-16 RX ADMIN — FENTANYL CITRATE 50 MCG: 50 INJECTION, SOLUTION INTRAMUSCULAR; INTRAVENOUS at 14:18

## 2019-05-16 RX ADMIN — FENTANYL CITRATE 100 MCG: 50 INJECTION, SOLUTION INTRAMUSCULAR; INTRAVENOUS at 13:04

## 2019-05-16 RX ADMIN — DEXAMETHASONE SODIUM PHOSPHATE 8 MG: 4 INJECTION, SOLUTION INTRA-ARTICULAR; INTRALESIONAL; INTRAMUSCULAR; INTRAVENOUS; SOFT TISSUE at 14:25

## 2019-05-16 RX ADMIN — Medication 1 AMPULE: at 21:40

## 2019-05-16 RX ADMIN — Medication 5 ML: at 21:40

## 2019-05-16 RX ADMIN — ROCURONIUM BROMIDE 40 MG: 10 INJECTION, SOLUTION INTRAVENOUS at 13:04

## 2019-05-16 RX ADMIN — Medication 2 G: at 13:20

## 2019-05-16 RX ADMIN — NEOSTIGMINE METHYLSULFATE 3 MG: 1 INJECTION INTRAVENOUS at 14:52

## 2019-05-16 RX ADMIN — SODIUM CHLORIDE, SODIUM LACTATE, POTASSIUM CHLORIDE, AND CALCIUM CHLORIDE: 600; 310; 30; 20 INJECTION, SOLUTION INTRAVENOUS at 14:20

## 2019-05-16 RX ADMIN — FAMOTIDINE 20 MG: 20 TABLET ORAL at 12:07

## 2019-05-16 RX ADMIN — PROPOFOL 180 MG: 10 INJECTION, EMULSION INTRAVENOUS at 13:04

## 2019-05-16 RX ADMIN — Medication 2 G: at 18:31

## 2019-05-16 RX ADMIN — OXYCODONE HYDROCHLORIDE 5 MG: 5 TABLET ORAL at 15:30

## 2019-05-16 NOTE — ANESTHESIA PREPROCEDURE EVALUATION
Relevant Problems No relevant active problems Anesthetic History PONV Review of Systems / Medical History Patient summary reviewed Pulmonary Smoker (Former) Asthma Neuro/Psych Cardiovascular Hypertension (no meds currently) Hyperlipidemia Exercise tolerance: >4 METS 
  
GI/Hepatic/Renal 
Within defined limits GERD Endo/Other Diabetes: type 2 Other Findings Physical Exam 
 
Airway Mallampati: II 
TM Distance: > 6 cm Neck ROM: normal range of motion Mouth opening: Normal 
 
 Cardiovascular Regular rate and rhythm,  S1 and S2 normal,  no murmur, click, rub, or gallop Dental 
No notable dental hx Pulmonary Breath sounds clear to auscultation Abdominal 
 
 
 
 Other Findings Anesthetic Plan ASA: 2 Anesthesia type: general 
 
 
 
 
 
Anesthetic plan and risks discussed with: Patient

## 2019-05-16 NOTE — ANESTHESIA POSTPROCEDURE EVALUATION
Procedure(s): 
L3-S1 LAMINECTOMY. general 
 
Anesthesia Post Evaluation Patient location during evaluation: PACU Patient participation: complete - patient participated Level of consciousness: awake and alert Pain management: adequate Airway patency: patent Anesthetic complications: no 
Cardiovascular status: acceptable Respiratory status: acceptable Hydration status: acceptable Post anesthesia nausea and vomiting:  none Vitals Value Taken Time /65 5/16/2019  4:03 PM  
Temp 36.4 °C (97.6 °F) 5/16/2019  3:41 PM  
Pulse 56 5/16/2019  4:14 PM  
Resp 16 5/16/2019  3:43 PM  
SpO2 97 % 5/16/2019  4:14 PM  
Vitals shown include unvalidated device data.

## 2019-05-16 NOTE — PROGRESS NOTES
05/16/19 1700 Dual Skin Pressure Injury Assessment Dual Skin Pressure Injury Assessment WDL Second Care Provider (Based on Facility Policy) Elsy Bailey RN Skin Integumentary Skin Integumentary (WDL) X Skin Condition/Temp Dry; Warm  
Skin Color Appropriate for ethnicity Skin Integrity Incision (comment); Scars (comment) (Incision L3-S1) Turgor Non-tenting Wound Prevention and Protection Methods Orientation of Wound Prevention Posterior Location of Wound Prevention Sacrum/Coccyx Dressing Present  No  
Wound Offloading (Prevention Methods) Bed, pressure reduction mattress;Pillows;Repositioning

## 2019-05-16 NOTE — BRIEF OP NOTE
BRIEF OPERATIVE NOTE Date of Procedure: 5/16/2019 Preoperative Diagnosis: Lumbar stenosis with neurogenic claudication [M48.062] Postoperative Diagnosis: Lumbar stenosis with neurogenic claudication [M48.062] Procedure(s): 
L3-S1 LAMINECTOMY Surgeon(s) and Role: 
   * Giacomo Siu MD - Primary Surgical Assistant: staff Surgical Staff: 
Circ-1: Amie Mederos RN 
Circ-Relief: Donna Paulino RN Radiology Technician: Alessandro Moody, RT, R, CT Scrub Tech-1: Bambi Snyder Scrub Tech-2: Kandi Sacks Scrub Tech-3: Stacy Ojeda Event Time In Time Out Incision Start [phone] [id number] Incision Close Anesthesia: General  
Estimated Blood Loss: 200cc Specimens: * No specimens in log * Findings: stenosis Complications: none Implants: * No implants in log *

## 2019-05-17 LAB
BASOPHILS # BLD: 0 K/UL (ref 0–0.2)
BASOPHILS NFR BLD: 0 % (ref 0–2)
DIFFERENTIAL METHOD BLD: ABNORMAL
EOSINOPHIL # BLD: 0 K/UL (ref 0–0.8)
EOSINOPHIL NFR BLD: 0 % (ref 0.5–7.8)
ERYTHROCYTE [DISTWIDTH] IN BLOOD BY AUTOMATED COUNT: 12.4 % (ref 11.9–14.6)
HCT VFR BLD AUTO: 31.1 % (ref 35.8–46.3)
HGB BLD-MCNC: 10.3 G/DL (ref 11.7–15.4)
IMM GRANULOCYTES # BLD AUTO: 0 K/UL (ref 0–0.5)
IMM GRANULOCYTES NFR BLD AUTO: 0 % (ref 0–5)
LYMPHOCYTES # BLD: 1.1 K/UL (ref 0.5–4.6)
LYMPHOCYTES NFR BLD: 11 % (ref 13–44)
MCH RBC QN AUTO: 31.4 PG (ref 26.1–32.9)
MCHC RBC AUTO-ENTMCNC: 33.1 G/DL (ref 31.4–35)
MCV RBC AUTO: 94.8 FL (ref 79.6–97.8)
MONOCYTES # BLD: 0.9 K/UL (ref 0.1–1.3)
MONOCYTES NFR BLD: 9 % (ref 4–12)
NEUTS SEG # BLD: 8.4 K/UL (ref 1.7–8.2)
NEUTS SEG NFR BLD: 80 % (ref 43–78)
NRBC # BLD: 0 K/UL (ref 0–0.2)
PLATELET # BLD AUTO: 221 K/UL (ref 150–450)
PMV BLD AUTO: 9.7 FL (ref 9.4–12.3)
RBC # BLD AUTO: 3.28 M/UL (ref 4.05–5.2)
WBC # BLD AUTO: 10.5 K/UL (ref 4.3–11.1)

## 2019-05-17 PROCEDURE — 97161 PT EVAL LOW COMPLEX 20 MIN: CPT

## 2019-05-17 PROCEDURE — 36415 COLL VENOUS BLD VENIPUNCTURE: CPT

## 2019-05-17 PROCEDURE — 96374 THER/PROPH/DIAG INJ IV PUSH: CPT

## 2019-05-17 PROCEDURE — 74011250636 HC RX REV CODE- 250/636: Performed by: ORTHOPAEDIC SURGERY

## 2019-05-17 PROCEDURE — 74011250637 HC RX REV CODE- 250/637: Performed by: ORTHOPAEDIC SURGERY

## 2019-05-17 PROCEDURE — 97530 THERAPEUTIC ACTIVITIES: CPT

## 2019-05-17 PROCEDURE — 74011250637 HC RX REV CODE- 250/637: Performed by: NURSE PRACTITIONER

## 2019-05-17 PROCEDURE — 85025 COMPLETE CBC W/AUTO DIFF WBC: CPT

## 2019-05-17 PROCEDURE — 99218 HC RM OBSERVATION: CPT

## 2019-05-17 RX ORDER — ACETAMINOPHEN 500 MG
500 TABLET ORAL
Status: DISCONTINUED | OUTPATIENT
Start: 2019-05-17 | End: 2019-05-19 | Stop reason: HOSPADM

## 2019-05-17 RX ADMIN — FAMOTIDINE 20 MG: 20 TABLET ORAL at 21:56

## 2019-05-17 RX ADMIN — OXYCODONE HYDROCHLORIDE AND ACETAMINOPHEN 1 TABLET: 7.5; 325 TABLET ORAL at 02:38

## 2019-05-17 RX ADMIN — Medication 5 ML: at 21:57

## 2019-05-17 RX ADMIN — Medication 1 AMPULE: at 09:16

## 2019-05-17 RX ADMIN — PAROXETINE HYDROCHLORIDE 10 MG: 20 TABLET, FILM COATED ORAL at 09:16

## 2019-05-17 RX ADMIN — PANTOPRAZOLE SODIUM 40 MG: 40 TABLET, DELAYED RELEASE ORAL at 05:02

## 2019-05-17 RX ADMIN — FAMOTIDINE 20 MG: 20 TABLET ORAL at 09:16

## 2019-05-17 RX ADMIN — Medication 1 AMPULE: at 21:56

## 2019-05-17 RX ADMIN — Medication 10 ML: at 05:05

## 2019-05-17 RX ADMIN — OXYCODONE HYDROCHLORIDE AND ACETAMINOPHEN 1 TABLET: 7.5; 325 TABLET ORAL at 09:16

## 2019-05-17 RX ADMIN — ONDANSETRON 4 MG: 2 INJECTION INTRAMUSCULAR; INTRAVENOUS at 17:10

## 2019-05-17 RX ADMIN — MONTELUKAST SODIUM 10 MG: 10 TABLET, FILM COATED ORAL at 09:16

## 2019-05-17 RX ADMIN — Medication 2 G: at 09:22

## 2019-05-17 RX ADMIN — LEVOTHYROXINE SODIUM 100 MCG: 100 TABLET ORAL at 05:02

## 2019-05-17 RX ADMIN — Medication 2 G: at 02:33

## 2019-05-17 RX ADMIN — Medication 5 ML: at 14:48

## 2019-05-17 RX ADMIN — ACETAMINOPHEN 500 MG: 500 TABLET, FILM COATED ORAL at 22:01

## 2019-05-17 NOTE — PROGRESS NOTES
ORTHO PROGRESS NOTE May 17, 2019 Admit Date: 2019 Admit Diagnosis: Lumbar stenosis with neurogenic claudication [M48.062] Spinal stenosis [M48.00] Post Op day: 1 Day Post-Op Subjective:  
 
Odilon Steinberg is a patient who is now 1 Day Post-Op  and has complaints  of mild leg pain. .  
 
 
Objective:  
 
PT/OT: to progress today Vital Signs:   
Patient Vitals for the past 8 hrs: 
 BP Temp Pulse Resp SpO2  
19 0720 131/76 98.2 °F (36.8 °C) 64 18 98 % 19 0441 124/68 97.8 °F (36.6 °C) 65 18 97 % Temp (24hrs), Av.9 °F (36.6 °C), Min:96.6 °F (35.9 °C), Max:98.5 °F (36.9 °C) LAB:   
Recent Labs 19 
7005 HGB 10.3* WBC 10.5  I/O: 
No intake/output data recorded. 05/15 1901 -  0700 In: 8230 [I.V.:1550] Out: 200 [Drains:150] Physical Exam: 
 
Awake and in no acute distress. Mood and affect appropriate. Respirations unlabored and no evidence cyanosis. Calves nontender. Abdomen soft and nontender. Dressing clean/dry No new neurologic deficit. Assessment:  
  
Patient Active Problem List  
Diagnosis Code  Asthma J45.909  Unipolar depression ([address] F33.9  STEPHANIE (obstructive sleep apnea) G47.33  
 Obesity E66.9  
 Essential hypertension, benign I10  
 Osteopenia M85.80  Type 2 diabetes mellitus without complication (HCC) D99.6  Dyslipidemia E78.5  Medicare annual wellness visit, subsequent Z00.00  Anxiety F41.9  Microscopic hematuria R31.29  
 Breast mass in female N63.0  
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  
 
 
1 Day Post-Op STATUS POST Procedure(s): 
L3-S1 LAMINECTOMY Plan:  
 
Continue PT/OT/Rehab Discontinue:   
Consult: none Anticipate discharge to: HOME when cleared with PT Signed By: Benjamin Lindo NP

## 2019-05-17 NOTE — OP NOTES
[facility]  OPERATIVE REPORT    Name:  Eufemia Butler  MR#:  [id number]  :  1950  ACCOUNT #:  [de-identified]  DATE OF SERVICE:  2019    PREOPERATIVE DIAGNOSIS:  Lumbar spinal stenosis L3, L4, L5 and S1.    POSTOPERATIVE DIAGNOSIS:  Lumbar spinal stenosis L3, L4, L5 and S1.    PROCEDURES PERFORMED:  Bilateral L3, L4, L5 and S1 laminectomy, partial facetectomy, foraminotomy - CPT codes 95494, 63048 x3. SURGEON:  Dominick Mckenzie. Tanya Izaguirre MD    ASSISTANT:  Staff    ANESTHESIA:  GETA. COMPLICATIONS:  None. SPECIMENS REMOVED:  None. IMPLANTS:  None. ESTIMATED BLOOD LOSS:  200 mL. FLUIDS:  A liter of crystalloid. DRAINS:  One Hemovac. INDICATIONS:  The patient is a 69-year-old female with multilevel spinal stenosis. She is having right leg pain greater than the left and she has got only temporary relief with epidural blocks, activity restriction, pain medication. So, she is brought for surgical treatment. PROCEDURE:  The patient was brought to the operating room. After administration of anesthesia, IV antibiotic and placement of monitoring lines, she was positioned prone [facility] with a sling. Her back was prepped with Betadine and sterilely draped. At this point, surgeon called a time-out and confirmed the procedure to be performed, her allergy history and the fact she had received her preoperative IV antibiotics. C-arm was brought in. We identified the L3-4, L4-5 and L5-S1 levels; marked them on the skin. We then made a midline incision over this area with a scalpel, dissected down through the thin subcutaneous tissue with electrocautery to the deep fascia. Deep fascia was incised on either side of the spinous processes. We dissected down along the lamina out to the facet joint bilaterally. We placed an angled curette in what was the felt to be the L5-S1 interlaminar space. Lateral C-arm x-ray was taken, which confirmed our location.   We then placed our retractors. We removed the spinous processes of L3, 4, 5 and S1 with a rongeur. We burred down the lamina bilaterally at each level. We rongeured off as much of the dorsal bone as we could. Then, we stripped the ligamentum off the undersurface of the bone with an angled curette and used Kerrisons to perform bilateral laminectomies at L3, L4, L5 and S1. We undercut the facet joint, performed foraminotomies. After we had completed this, the thecal sac was free. The nerve roots were all free. There was a significant amount of epidural bleeding that we managed with FloSeal and compression. We thoroughly irrigated the wound multiple times, suctioned it dry. We suctioned out the FloSeal and placed some more FloSeal loosely over the top of the decompression site. There was just some slight bleeding. We felt the patient needed a drain, so we placed a medium Hemovac drain deep in the wound, brought out through a separate proximal stab incision, then placed a gram of vancomycin powder in the wound. Then, we closed the deep fascia with interrupted figure-of-eight stitches #1 Vicryl. We anesthetized the skin and subcutaneous tissue with a total of 30 mL 0.5% Marcaine with epinephrine. Then, we closed the subcutaneous tissue with interrupted stitches of 2-0 Vicryl and the skin was closed with a running subcuticular stitch of 3-0 Vicryl. Steri-Strips, dry sterile dressings were applied. The patient was then rolled supine onto the recovery room bed having tolerated the procedure well.       Tiffany Shah MD      [id number]  D:  05/16/2019 15:07  T:  05/16/2019 15:17  JOB #:  [id number]  CC:  Chilo Hutchinson MD

## 2019-05-17 NOTE — PROGRESS NOTES
Spiritual Care Visit. Initial visit. Patient was visited by [facility]. Entered by Joaquin Jolly, Staff Chaplain. Silver, Alina

## 2019-05-17 NOTE — PROGRESS NOTES
Problem: Mobility Impaired (Adult and Pediatric) Goal: *Acute Goals and Plan of Care (Insert Text) Description Discharge Goals: 1. Patient will perform bed mobility via log roll with INDEPENDENCE within 7 days. 2. Patient will transfer bed to chair with MODIFIED INDEPENDENCE  within 7 days. 3. Patient will demonstrate FAIR+ DYNAMIC STANDING balance within 7 day(s). 4. Patient will ambulate 300+ using least restrictive assistive device and MODIFIED INDEPENDENCE within 7 days. 5. Patient will ascend and descend steps with SUPERVISION to safely reach bedroom/bathoom of patient's home within 7 days. Outcome: Progressing Towards Goal 
  
 
PHYSICAL THERAPY: Initial Assessment, Daily Note and AM 5/17/2019 OBSERVATION: PT Visit Days : 1 [place] / [place] / Product Type: Medicare /   
 
SPINAL PRECAUTIONS 
  
NAME/AGE/GENDER: Julia Whittaker is a 78 y.o. female PRIMARY DIAGNOSIS: Lumbar stenosis with neurogenic claudication [M48.062] Spinal stenosis [M48.00] Spinal stenosis of lumbar region at multiple levels Spinal stenosis of lumbar region at multiple levels Procedure(s) (LRB): 
L3-S1 LAMINECTOMY (N/A) 1 Day Post-Op ICD-10: Treatment Diagnosis: · Difficulty in walking, Not elsewhere classified (R26.2) · History of falling (Z91.81) · Low Back Pain (M54.5) Precaution/Allergies: 
Hydrocodone-acetaminophen and Sulfa (sulfonamide antibiotics) ASSESSMENT:  
Ms. Leticia Munoz is a 78year old female 1 day s/p L3-S1 laminectomy with spinal precautions. Patient seen this AM for initial physical therapy evaluation: presents in bathroom with RN and endorses 4/10 post operative low back pain. Patient lives with her spouse in a two story residence with 3 steps to enter. At baseline, patient is independent with ADLs, ambulates without utilizing an assistive device, and endorses decreased community level ambulation tolerance and standing tolerance PTA.  Patient is active with aquatic exercise and personal training for balance and core activity 6 days/week per report. Today, B UE strength/ROM with mild limitations in proximal R UE but functional, B LE motor functionally 4/5 with 3+/5 L plantarflexion appreciated, and sensation is diminished to light touch L4-S1 L LE (patient's baseline) and intact L3-S1 R LE. Educated on spinal precautions, log roll technique, activity pacing, and home safety via teach back method with patient practice and demonstration. Patient performed bed mobility via log roll with supervision-CGA, transfers with SBA-CGA, and ambulation x20ft in room with RW and CGA. Demonstrated a slow, step-to gait pattern with good static and fair dynamic balance. Ms. Thais Pineda is progressing well post operatively; does demonstrate decreased balance/gait status from baseline. Will follow with acute PT plan of care and progress toward stated discharge goals. Anticipate patient will progress well toward goals. This section established at most recent assessment PROBLEM LIST (Impairments causing functional limitations): 1. Decreased Strength 2. Decreased Transfer Abilities 3. Decreased Ambulation Ability/Technique 4. Decreased Balance 5. Increased Pain 6. Decreased Activity Tolerance 7. Decreased Flexibility/Joint Mobility 8. Decreased Knowledge of Precautions INTERVENTIONS PLANNED: (Benefits and precautions of physical therapy have been discussed with the patient.) 1. Balance Exercise 2. Bed Mobility 3. Gait Training 4. Group Therapy 5. Home Exercise Program (HEP) 6. Neuromuscular Re-education/Strengthening 7. Range of Motion (ROM) 8. Therapeutic Activites 9. Therapeutic Exercise/Strengthening 10. Transfer Training TREATMENT PLAN: Frequency/Duration: twice daily until stated goals are met Rehabilitation Potential For Stated Goals: Good REHAB RECOMMENDATIONS (at time of discharge pending progress):   
Placement: It is my opinion, based on this patient's performance to date, that Ms. Leticia Munoz may benefit from participating in 1-2 additional therapy sessions in order to continue to assess for rehab potential and then make recommendation for disposition at discharge. Equipment:  
? Patient has a RW at home if needed post operatively. HISTORY:  
History of Present Injury/Illness (Reason for Referral): S/p L3-S1 laminectomy Past Medical History/Comorbidities:  
Ms. Leticia Munoz  has a past medical history of Allergic rhinitis, cause unspecified, Anxiety, Arthritis, Asthma, Cancer ([address] (12/2014), Depression, Dyslipidemia, Former smoker, GERD (gastroesophageal reflux disease), Hyperglycemia, Hypertension, Nausea & vomiting, Subacute thyroiditis (2017), Thyroid cancer ([address] and Unspecified sleep apnea. She also has no past medical history of Adverse effect of anesthesia, Aneurysm ([address] Arrhythmia, Autoimmune disease ([address] CAD (coronary artery disease), Chronic kidney disease, Chronic obstructive pulmonary disease ([address] Coagulation disorder ([address] Difficult intubation, Endocarditis, Heart failure ([address] Ill-defined condition, Liver disease, Malignant hyperthermia due to anesthesia, Nicotine vapor product user, Non-nicotine vapor product user, Pseudocholinesterase deficiency, PUD (peptic ulcer disease), Rheumatic fever, Seizures ([address] Stroke ([address] or Thromboembolus ([address] Ms. Leticia Munoz  has a past surgical history that includes hx bunionectomy (Right, 2013); hx bunionectomy (Left, 2012); hx colonoscopy (2012); hx thyroidectomy (2018); hx gastrectomy (2015); hx jory and bso (2000); hx breast reduction (Bilateral, 2006); hx breast lumpectomy (Right); hx other surgical (Right, 1976); hx orthopaedic (Right, 2014); hx orthopaedic (Bilateral); hx orthopaedic (Left); hx orthopaedic (Bilateral); hx tonsillectomy (2000); hx adenoidectomy; and hx heent (02/15/2018). Social History/Living Environment: Home Environment: Private residence # Steps to Enter: 3 Rails to Enter: Yes Hand Rails : Bilateral 
One/Two Story Residence: Two story # of Interior Steps: 15 Interior Rails: Both Living Alone: No 
Support Systems: Spouse/Significant Other/Partner, Family member(s) Patient Expects to be Discharged to[de-identified] Private residence Current DME Used/Available at Home: Walker, rolling, Cane, straight, Wheelchair Tub or Shower Type: Shower Prior Level of Function/Work/Activity: 
Patient lives with her spouse in a two story residence with 3 steps to enter. At baseline, patient is independent with ADLs, ambulates without utilizing an assistive device, and endorses decreased community level ambulation tolerance and standing tolerance PTA. Patient is active with aquatic exercise and personal training for balance and core activity 6 days/week per report. Number of Personal Factors/Comorbidities that affect the Plan of Care: 0: LOW COMPLEXITY EXAMINATION:  
Most Recent Physical Functioning:  
Gross Assessment: 
AROM: Generally decreased, functional 
Strength: Generally decreased, functional(L plantar flexion; generalized) Coordination: Within functional limits Sensation: Impaired(Diminished light touch L4-S1 L LE (baseline)) Posture: 
Posture (WDL): Exceptions to [facility] Posture Assessment: Trunk flexion(Mild) Balance: 
Sitting: Intact Standing: Impaired Standing - Static: Good Standing - Dynamic : Fair Bed Mobility: 
Rolling: Supervision Supine to Sit: Contact guard assistance Sit to Supine: Stand-by assistance Scooting: Supervision Wheelchair Mobility: 
  
Transfers: 
Sit to Stand: Contact guard assistance;Stand-by assistance Stand to Sit: Contact guard assistance Bed to Chair: Contact guard assistance Gait: 
  
Speed/Cailin: Slow Step Length: Left shortened;Right shortened Gait Abnormalities: Decreased step clearance; Step to gait Distance (ft): 20 Feet (ft) Assistive Device: Walker, rolling Ambulation - Level of Assistance: Contact guard assistance Interventions: Safety awareness training; Tactile cues; Verbal cues Body Structures Involved: 1. Nerves 2. Muscles Body Functions Affected: 1. Neuromusculoskeletal 
2. Movement Related Activities and Participation Affected: 1. Mobility 2. Self Care Number of elements that affect the Plan of Care: 4+: HIGH COMPLEXITY CLINICAL PRESENTATION:  
Presentation: Stable and uncomplicated: LOW COMPLEXITY CLINICAL DECISION MAKIN[address] [place] [address] AM-PAC 6 Clicks Basic Mobility Inpatient Short Form How much difficulty does the patient currently have. .. Unable A Lot A Little None 1. Turning over in bed (including adjusting bedclothes, sheets and blankets)? ? 1   ? 2   ? 3   ? 4  
2. Sitting down on and standing up from a chair with arms ( e.g., wheelchair, bedside commode, etc.)   ? 1   ? 2   ? 3   ? 4  
3. Moving from lying on back to sitting on the side of the bed?   ? 1   ? 2   ? 3   ? 4 How much help from another person does the patient currently need. .. Total A Lot A Little None 4. Moving to and from a bed to a chair (including a wheelchair)? ? 1   ? 2   ? 3   ? 4  
5. Need to walk in hospital room? ? 1   ? 2   ? 3   ? 4  
6. Climbing 3-5 steps with a railing? ? 1   ? 2   ? 3   ? 4  
© , Trustees of [address] [place] [address], under license to [employer]. All rights reserved Score:  Initial: 19 Most Recent: 19 (Date: 19) Interpretation of Tool:  Represents activities that are increasingly more difficult (i.e. Bed mobility, Transfers, Gait). Medical Necessity:    
· Patient demonstrates good ·  rehab potential due to higher previous functional level. Reason for Services/Other Comments: 
· Patient continues to require skilled intervention due to decreased functional mobility and balance/gait status from baseline. · . Use of outcome tool(s) and clinical judgement create a POC that gives a: Clear prediction of patient's progress: LOW COMPLEXITY  
  
 
 
 
TREATMENT:  
(In addition to Assessment/Re-Assessment sessions the following treatments were rendered) Pre-treatment Symptoms/Complaints:   
Pain: Initial:  
Pain Intensity 1: 4 Pain Location 1: Back Pain Orientation 1: Lower Pain Intervention(s) 1: Ambulation/Increased Activity, Emotional support, Position, Repositioned, Rest  Post Session:  4/10; unchanged; endorses comfort in straight back chair. Initial Evaluation (10 Minutes) Therapeutic Activity: (    23 Minutes): Therapeutic activities including bed mobility, transfer training, balance training, safety awareness training, ambulation on level ground x20ft, and patient education on spinal precautions, log roll technique, home safety with teach back method, patient practice, and demonstration to improve mobility and balance. Required minimal Safety awareness training; Tactile cues; Verbal cues to promote dynamic balance in standing. Braces/Orthotics/Lines/Etc:  
· Lumbar Hemovac Treatment/Session Assessment:   
· Response to Treatment:  See above · Interdisciplinary Collaboration:  
o Physical Therapist 
o Registered Nurse · After treatment position/precautions:  
o Up in chair 
o Bed/Chair-wheels locked 
o Bed in low position 
o Call light within reach 
o RN notified 
o Family at bedside 
o Patient in straight back chair, NAD, needs met and within reach, educated on falls precautions and to call for assistance out of chair; verbalized understanding, spouse attending at bedside. · Compliance with Program/Exercises: Will assess as treatment progresses · Recommendations/Intent for next treatment session: \"Next visit will focus on advancements to more challenging activities and reduction in assistance provided\". Total Treatment Duration: PT Patient Time In/Time Out Time In: 4507 Time Out: 1009 Sherita Suresh DPT

## 2019-05-17 NOTE — PROGRESS NOTES
Problem: Mobility Impaired (Adult and Pediatric) Goal: *Acute Goals and Plan of Care (Insert Text) Description Discharge Goals: 1. Patient will perform bed mobility via log roll with INDEPENDENCE within 7 days. 2. Patient will transfer bed to chair with MODIFIED INDEPENDENCE  within 7 days. 3. Patient will demonstrate FAIR+ DYNAMIC STANDING balance within 7 day(s). 4. Patient will ambulate 300+ using least restrictive assistive device and MODIFIED INDEPENDENCE within 7 days. 5. Patient will ascend and descend steps with SUPERVISION to safely reach bedroom/bathoom of patient's home within 7 days. Outcome: Progressing Towards Goal 
  
 
PHYSICAL THERAPY: Daily Note and PM 5/17/2019 OBSERVATION: PT Visit Days : 1 [place] / Product Type: Medicare /   
 
SPINAL PRECAUTIONS 
  
NAME/AGE/GENDER: Cheyenne Lesch is a 78 y.o. female PRIMARY DIAGNOSIS: Lumbar stenosis with neurogenic claudication [M48.062] Spinal stenosis [M48.00] Spinal stenosis of lumbar region at multiple levels Spinal stenosis of lumbar region at multiple levels Procedure(s) (LRB): 
L3-S1 LAMINECTOMY (N/A) 1 Day Post-Op ICD-10: Treatment Diagnosis: · Difficulty in walking, Not elsewhere classified (R26.2) · History of falling (Z91.81) · Low Back Pain (M54.5) Precaution/Allergies: 
Hydrocodone-acetaminophen and Sulfa (sulfonamide antibiotics) ASSESSMENT:  
Ms. Marquis Sevilla is a 78year old female 1 day s/p L3-S1 laminectomy with spinal precautions. Patient seen this PM for physical therapy treatment session per BID plan of care: presents supine in bed, endorses minimal low back aching, no acute pain, and agreeable to treatment. Reviewed log roll, patient performed with CGA and required minimal assistance for supine to sitting transfer. Activity pacing education; verbalized understanding.  Minimal assistance for sit to stand from bed and ambulated x25ft with RW and CGA. Patient became acutely dizzy, nauseas, and shakey; transitioned into sitting and vitals assessed: BP: 181/68 HR: 66bpm and Sp02 100% on room air. After seated rest break, symptoms improved. Patient ambulated an additional 25ft back to room with RW and close CGA. Demonstrate a slow, shuffled, step-to gait pattern with fair dynamic balance. Minimal assistance for B LE for siting to supine transfer. Vitals re-assessed and documented. RN at bedside and updated on patient status/vitals. Additional assistance required this PM compared to AM evaluation. Anticipate patient will continue to progress well toward stated goals; remain ongoing. Continue with BID plan of care. This section established at most recent assessment PROBLEM LIST (Impairments causing functional limitations): 1. Decreased Strength 2. Decreased Transfer Abilities 3. Decreased Ambulation Ability/Technique 4. Decreased Balance 5. Increased Pain 6. Decreased Activity Tolerance 7. Decreased Flexibility/Joint Mobility 8. Decreased Knowledge of Precautions INTERVENTIONS PLANNED: (Benefits and precautions of physical therapy have been discussed with the patient.) 1. Balance Exercise 2. Bed Mobility 3. Gait Training 4. Group Therapy 5. Home Exercise Program (HEP) 6. Neuromuscular Re-education/Strengthening 7. Range of Motion (ROM) 8. Therapeutic Activites 9. Therapeutic Exercise/Strengthening 10. Transfer Training TREATMENT PLAN: Frequency/Duration: twice daily until stated goals are met Rehabilitation Potential For Stated Goals: Good REHAB RECOMMENDATIONS (at time of discharge pending progress):   
Placement: It is my opinion, based on this patient's performance to date, that Ms. Gracia Bermudez may benefit from participating in 1-2 additional therapy sessions in order to continue to assess for rehab potential and then make recommendation for disposition at discharge. Equipment: ? Patient has a RW at home if needed post operatively. HISTORY:  
History of Present Injury/Illness (Reason for Referral): S/p L3-S1 laminectomy Past Medical History/Comorbidities:  
Ms. Chelsy Jarrell  has a past medical history of Allergic rhinitis, cause unspecified, Anxiety, Arthritis, Asthma, Cancer ([address] (12/2014), Depression, Dyslipidemia, Former smoker, GERD (gastroesophageal reflux disease), Hyperglycemia, Hypertension, Nausea & vomiting, Subacute thyroiditis (2017), Thyroid cancer ([address] and Unspecified sleep apnea. She also has no past medical history of Adverse effect of anesthesia, Aneurysm ([address] Arrhythmia, Autoimmune disease ([address] CAD (coronary artery disease), Chronic kidney disease, Chronic obstructive pulmonary disease ([address] Coagulation disorder ([address] Difficult intubation, Endocarditis, Heart failure ([address] Ill-defined condition, Liver disease, Malignant hyperthermia due to anesthesia, Nicotine vapor product user, Non-nicotine vapor product user, Pseudocholinesterase deficiency, PUD (peptic ulcer disease), Rheumatic fever, Seizures ([address] Stroke ([address] or Thromboembolus ([address] Ms. Chelsy Jarrell  has a past surgical history that includes hx bunionectomy (Right, 2013); hx bunionectomy (Left, 2012); hx colonoscopy (2012); hx thyroidectomy (2018); hx gastrectomy (2015); hx jory and bso (2000); hx breast reduction (Bilateral, 2006); hx breast lumpectomy (Right); hx other surgical (Right, 1976); hx orthopaedic (Right, 2014); hx orthopaedic (Bilateral); hx orthopaedic (Left); hx orthopaedic (Bilateral); hx tonsillectomy (2000); hx adenoidectomy; and hx heent (02/15/2018). Social History/Living Environment:  
Home Environment: Private residence # Steps to Enter: 3 Rails to Enter: Yes Hand Rails : Bilateral 
One/Two Story Residence: Two story # of Interior Steps: 15 Interior Rails: Both Living Alone: No 
Support Systems: Spouse/Significant Other/Partner, Family member(s) Patient Expects to be Discharged to[de-identified] Private residence Current DME Used/Available at Home: Walker, rolling, Cane, straight, Wheelchair Tub or Shower Type: Shower Prior Level of Function/Work/Activity: 
Patient lives with her spouse in a two story residence with 3 steps to enter. At baseline, patient is independent with ADLs, ambulates without utilizing an assistive device, and endorses decreased community level ambulation tolerance and standing tolerance PTA. Patient is active with aquatic exercise and personal training for balance and core activity 6 days/week per report. Number of Personal Factors/Comorbidities that affect the Plan of Care: 0: LOW COMPLEXITY EXAMINATION:  
Most Recent Physical Functioning:  
Gross Assessment: 
AROM: Generally decreased, functional 
Strength: Generally decreased, functional(L plantar flexion; generalized) Coordination: Within functional limits Sensation: Impaired(Diminished light touch L4-S1 L LE (baseline)) Posture: 
Posture (WDL): Exceptions to [facility] Posture Assessment: Trunk flexion(Mild) Balance: 
Sitting: Intact Standing: Impaired Standing - Static: Fair Standing - Dynamic : Fair Bed Mobility: 
Rolling: Contact guard assistance Supine to Sit: Minimum assistance Sit to Supine: Minimum assistance Scooting: Supervision Wheelchair Mobility: 
  
Transfers: 
Sit to Stand: Minimum assistance Stand to Sit: Minimum assistance Bed to Chair: Contact guard assistance Gait: 
  
Base of Support: Center of gravity altered Speed/Cailin: Shuffled; Slow Step Length: Left shortened;Right shortened Gait Abnormalities: Decreased step clearance; Step to gait Distance (ft): 50 Feet (ft) Assistive Device: Walker, rolling Ambulation - Level of Assistance: Contact guard assistance Interventions: Safety awareness training; Tactile cues; Verbal cues Body Structures Involved: 1. Nerves 2. Muscles Body Functions Affected: 1.  Neuromusculoskeletal 
 2. Movement Related Activities and Participation Affected: 1. Mobility 2. Self Care Number of elements that affect the Plan of Care: 4+: HIGH COMPLEXITY CLINICAL PRESENTATION:  
Presentation: Stable and uncomplicated: LOW COMPLEXITY CLINICAL DECISION MAKING:  
[employer] AM-PAC 6 Clicks Basic Mobility Inpatient Short Form How much difficulty does the patient currently have. .. Unable A Lot A Little None 1. Turning over in bed (including adjusting bedclothes, sheets and blankets)? ? 1   ? 2   ? 3   ? 4  
2. Sitting down on and standing up from a chair with arms ( e.g., wheelchair, bedside commode, etc.)   ? 1   ? 2   ? 3   ? 4  
3. Moving from lying on back to sitting on the side of the bed?   ? 1   ? 2   ? 3   ? 4 How much help from another person does the patient currently need. .. Total A Lot A Little None 4. Moving to and from a bed to a chair (including a wheelchair)? ? 1   ? 2   ? 3   ? 4  
5. Need to walk in hospital room? ? 1   ? 2   ? 3   ? 4  
6. Climbing 3-5 steps with a railing? ? 1   ? 2   ? 3   ? 4  
© 2007, Trustees of [facility] [employer], under license to [employer]. All rights reserved Score:  Initial: 19 Most Recent: 19 (Date: 5/17/19) Interpretation of Tool:  Represents activities that are increasingly more difficult (i.e. Bed mobility, Transfers, Gait). Medical Necessity:    
· Patient demonstrates good ·  rehab potential due to higher previous functional level. Reason for Services/Other Comments: 
· Patient continues to require skilled intervention due to decreased functional mobility and balance/gait status from baseline. · . Use of outcome tool(s) and clinical judgement create a POC that gives a: Clear prediction of patient's progress: LOW COMPLEXITY  
  
 
 
 
TREATMENT:  
(In addition to Assessment/Re-Assessment sessions the following treatments were rendered) Pre-treatment Symptoms/Complaints:   
Pain: Initial:  
Pain Intensity 1: 0 
 Pain Location 1: Back Pain Orientation 1: Lower Pain Intervention(s) 1: Ambulation/Increased Activity, Emotional support  Post Session:  4/10; unchanged; endorses comfort in straight back chair. Therapeutic Activity: (    25 Minutes): Therapeutic activities including bed mobility, transfer training, balance training, safety awareness training, ambulation on level ground 2x25ft, and patient education on spinal precautions, log roll technique, home safety with teach back method, patient practice, and demonstration to improve mobility and balance. Required minimal Safety awareness training; Tactile cues; Verbal cues to promote dynamic balance in standing. Braces/Orthotics/Lines/Etc:  
· Lumbar Hemovac Treatment/Session Assessment:   
· Response to Treatment:  See above · Interdisciplinary Collaboration:  
o Physical Therapist 
o Registered Nurse · After treatment position/precautions:  
o Up in chair 
o Bed/Chair-wheels locked 
o Bed in low position 
o Call light within reach 
o RN notified 
o Family at bedside 
o Patient in straight back chair, NAD, needs met and within reach, educated on falls precautions and to call for assistance out of chair; verbalized understanding, spouse attending at bedside. · Compliance with Program/Exercises: Will assess as treatment progresses · Recommendations/Intent for next treatment session: \"Next visit will focus on advancements to more challenging activities and reduction in assistance provided\". Total Treatment Duration: PT Patient Time In/Time Out Time In: 7438 Time Out: 1410 Dayla Days, DPT

## 2019-05-18 PROCEDURE — 99218 HC RM OBSERVATION: CPT

## 2019-05-18 PROCEDURE — 97530 THERAPEUTIC ACTIVITIES: CPT

## 2019-05-18 PROCEDURE — 74011250637 HC RX REV CODE- 250/637: Performed by: ORTHOPAEDIC SURGERY

## 2019-05-18 PROCEDURE — 74011250637 HC RX REV CODE- 250/637: Performed by: NURSE PRACTITIONER

## 2019-05-18 RX ADMIN — ACETAMINOPHEN 500 MG: 500 TABLET, FILM COATED ORAL at 05:17

## 2019-05-18 RX ADMIN — FAMOTIDINE 20 MG: 20 TABLET ORAL at 08:57

## 2019-05-18 RX ADMIN — CYCLOBENZAPRINE HYDROCHLORIDE 5 MG: 10 TABLET, FILM COATED ORAL at 09:43

## 2019-05-18 RX ADMIN — Medication 1 AMPULE: at 08:57

## 2019-05-18 RX ADMIN — CYCLOBENZAPRINE HYDROCHLORIDE 5 MG: 10 TABLET, FILM COATED ORAL at 00:06

## 2019-05-18 RX ADMIN — Medication 5 ML: at 21:19

## 2019-05-18 RX ADMIN — FAMOTIDINE 20 MG: 20 TABLET ORAL at 21:18

## 2019-05-18 RX ADMIN — Medication 5 ML: at 16:22

## 2019-05-18 RX ADMIN — Medication 1 AMPULE: at 21:18

## 2019-05-18 RX ADMIN — LEVOTHYROXINE SODIUM 100 MCG: 100 TABLET ORAL at 06:00

## 2019-05-18 RX ADMIN — ACETAMINOPHEN 500 MG: 500 TABLET, FILM COATED ORAL at 16:19

## 2019-05-18 RX ADMIN — PANTOPRAZOLE SODIUM 40 MG: 40 TABLET, DELAYED RELEASE ORAL at 05:17

## 2019-05-18 RX ADMIN — MONTELUKAST SODIUM 10 MG: 10 TABLET, FILM COATED ORAL at 08:57

## 2019-05-18 RX ADMIN — Medication 10 ML: at 05:18

## 2019-05-18 RX ADMIN — ACETAMINOPHEN 500 MG: 500 TABLET, FILM COATED ORAL at 22:19

## 2019-05-18 NOTE — PROGRESS NOTES
Problem: Mobility Impaired (Adult and Pediatric) Goal: *Acute Goals and Plan of Care (Insert Text) Description Discharge Goals: 1. Patient will perform bed mobility via log roll with INDEPENDENCE within 7 days. 2. Patient will transfer bed to chair with MODIFIED INDEPENDENCE  within 7 days. 3. Patient will demonstrate FAIR+ DYNAMIC STANDING balance within 7 day(s). 4. Patient will ambulate 300+ using least restrictive assistive device and MODIFIED INDEPENDENCE within 7 days. 5. Patient will ascend and descend steps with SUPERVISION to safely reach bedroom/bathoom of patient's home within 7 days. Outcome: Progressing Towards Goal 
  
 
PHYSICAL THERAPY: Daily Note and AM 5/18/2019 OBSERVATION: PT Visit Days : 1 [place] / [place] B / Product Type: Medicare /   
 
SPINAL PRECAUTIONS 
  
NAME/AGE/GENDER: Otilia Byrne is a 78 y.o. female PRIMARY DIAGNOSIS: Lumbar stenosis with neurogenic claudication [M48.062] Spinal stenosis [M48.00] Spinal stenosis of lumbar region at multiple levels Spinal stenosis of lumbar region at multiple levels Procedure(s) (LRB): 
L3-S1 LAMINECTOMY (N/A) 2 Days Post-Op ICD-10: Treatment Diagnosis: · Difficulty in walking, Not elsewhere classified (R26.2) · History of falling (Z91.81) · Low Back Pain (M54.5) Precaution/Allergies: 
Hydrocodone-acetaminophen and Sulfa (sulfonamide antibiotics) ASSESSMENT:  
Ms. Deion Frazier is supine in bed and ready to get up. She was able to log roll from a flat bed to the left side without railing or assistance and sit up. She stood into the walker and used the bathroom independently. She walked 250 feet with the walker and went up and down 10 stairs with CGA. She has a walker at home she plans to use and may sleep in chair on first floor for awhile. She is safe to return home with her  when discharged. This section established at most recent assessment PROBLEM LIST (Impairments causing functional limitations): 1. Decreased Strength 2. Decreased Transfer Abilities 3. Decreased Ambulation Ability/Technique 4. Decreased Balance 5. Increased Pain 6. Decreased Activity Tolerance 7. Decreased Flexibility/Joint Mobility 8. Decreased Knowledge of Precautions INTERVENTIONS PLANNED: (Benefits and precautions of physical therapy have been discussed with the patient.) 1. Balance Exercise 2. Bed Mobility 3. Gait Training 4. Group Therapy 5. Home Exercise Program (HEP) 6. Neuromuscular Re-education/Strengthening 7. Range of Motion (ROM) 8. Therapeutic Activites 9. Therapeutic Exercise/Strengthening 10. Transfer Training TREATMENT PLAN: Frequency/Duration: twice daily until stated goals are met Rehabilitation Potential For Stated Goals: Good REHAB RECOMMENDATIONS (at time of discharge pending progress):   
Placement: It is my opinion, based on this patient's performance to date, that Ms. Nikolas Nicholas may benefit from participating in 1-2 additional therapy sessions in order to continue to assess for rehab potential and then make recommendation for disposition at discharge. Equipment:  
? Patient has a RW at home if needed post operatively. HISTORY:  
History of Present Injury/Illness (Reason for Referral): S/p L3-S1 laminectomy Past Medical History/Comorbidities:  
Ms. Nikolas Nicholas  has a past medical history of Allergic rhinitis, cause unspecified, Anxiety, Arthritis, Asthma, Cancer ([address] (12/2014), Depression, Dyslipidemia, Former smoker, GERD (gastroesophageal reflux disease), Hyperglycemia, Hypertension, Nausea & vomiting, Subacute thyroiditis (2017), Thyroid cancer ([address] and Unspecified sleep apnea.  She also has no past medical history of Adverse effect of anesthesia, Aneurysm ([address] Arrhythmia, Autoimmune disease ([address] CAD (coronary artery disease), Chronic kidney disease, Chronic obstructive pulmonary disease ([address] Coagulation disorder ([address] Difficult intubation, Endocarditis, Heart failure ([address] Ill-defined condition, Liver disease, Malignant hyperthermia due to anesthesia, Nicotine vapor product user, Non-nicotine vapor product user, Pseudocholinesterase deficiency, PUD (peptic ulcer disease), Rheumatic fever, Seizures ([address] Stroke ([address] or Thromboembolus ([address] Ms. Kyle Segovia  has a past surgical history that includes hx bunionectomy (Right, 2013); hx bunionectomy (Left, 2012); hx colonoscopy (2012); hx thyroidectomy (2018); hx gastrectomy (2015); hx jory and bso (2000); hx breast reduction (Bilateral, 2006); hx breast lumpectomy (Right); hx other surgical (Right, 1976); hx orthopaedic (Right, 2014); hx orthopaedic (Bilateral); hx orthopaedic (Left); hx orthopaedic (Bilateral); hx tonsillectomy (2000); hx adenoidectomy; and hx heent (02/15/2018). Social History/Living Environment:  
Home Environment: Private residence # Steps to Enter: 3 Rails to Enter: Yes Hand Rails : Bilateral 
One/Two Story Residence: Two story # of Interior Steps: 15 Interior Rails: Both Living Alone: No 
Support Systems: Spouse/Significant Other/Partner, Family member(s) Patient Expects to be Discharged to[de-identified] Private residence Current DME Used/Available at Home: Walker, rolling, Cane, straight, Wheelchair Tub or Shower Type: Shower Prior Level of Function/Work/Activity: 
Patient lives with her spouse in a two story residence with 3 steps to enter. At baseline, patient is independent with ADLs, ambulates without utilizing an assistive device, and endorses decreased community level ambulation tolerance and standing tolerance PTA. Patient is active with aquatic exercise and personal training for balance and core activity 6 days/week per report. Number of Personal Factors/Comorbidities that affect the Plan of Care: 0: LOW COMPLEXITY EXAMINATION:  
Most Recent Physical Functioning:  
Gross Assessment: 
  
         
  
Posture: Balance: 
  Bed Mobility: 
Rolling: Independent; Additional time Supine to Sit: Independent; Additional time Wheelchair Mobility: 
  
Transfers: 
Sit to Stand: Modified independent Stand to Sit: Independent Gait: 
  
Speed/Cailin: Slow Gait Abnormalities: Decreased step clearance Distance (ft): 250 Feet (ft) Assistive Device: Walker, rolling Ambulation - Level of Assistance: Modified independent Number of Stairs Trained: 10 Stairs - Level of Assistance: Contact guard assistance Rail Use: Right Body Structures Involved: 1. Nerves 2. Muscles Body Functions Affected: 1. Neuromusculoskeletal 
2. Movement Related Activities and Participation Affected: 1. Mobility 2. Self Care Number of elements that affect the Plan of Care: 4+: HIGH COMPLEXITY CLINICAL PRESENTATION:  
Presentation: Stable and uncomplicated: LOW COMPLEXITY CLINICAL DECISION MAKING:  
[facility] AM-PAC 6 Clicks Basic Mobility Inpatient Short Form How much difficulty does the patient currently have. .. Unable A Lot A Little None 1. Turning over in bed (including adjusting bedclothes, sheets and blankets)? ? 1   ? 2   ? 3   ? 4  
2. Sitting down on and standing up from a chair with arms ( e.g., wheelchair, bedside commode, etc.)   ? 1   ? 2   ? 3   ? 4  
3. Moving from lying on back to sitting on the side of the bed?   ? 1   ? 2   ? 3   ? 4 How much help from another person does the patient currently need. .. Total A Lot A Little None 4. Moving to and from a bed to a chair (including a wheelchair)? ? 1   ? 2   ? 3   ? 4  
5. Need to walk in hospital room? ? 1   ? 2   ? 3   ? 4  
6. Climbing 3-5 steps with a railing? ? 1   ? 2   ? 3   ? 4  
© 2007, Trustees of [facility], under license to [employer]. All rights reserved Score:  Initial: 19 Most Recent: 19 (Date: 5/17/19) Interpretation of Tool:  Represents activities that are increasingly more difficult (i.e. Bed mobility, Transfers, Gait). Medical Necessity:    
· Patient demonstrates good ·  rehab potential due to higher previous functional level. Reason for Services/Other Comments: 
· Patient continues to require skilled intervention due to decreased functional mobility and balance/gait status from baseline. · . Use of outcome tool(s) and clinical judgement create a POC that gives a: Clear prediction of patient's progress: LOW COMPLEXITY  
  
 
 
 
TREATMENT:  
(In addition to Assessment/Re-Assessment sessions the following treatments were rendered) Pre-treatment Symptoms/Complaints:  \"I would love to brush my teeth\" Pain: Initial:  
Pain Intensity 1: 2  Post Session:  No complaints Therapeutic Activity: (    30 Minutes): Therapeutic activities including bed mobility, transfer training, ambulation, stair tranining and patient education on spinal precautions, and log roll technique, to improve mobility and balance. Required minimal   to promote dynamic balance in standing. Braces/Orthotics/Lines/Etc:  
· none Treatment/Session Assessment:   
· Response to Treatment:  See above · Interdisciplinary Collaboration:  
o Physical Therapy Assistant 
o Registered Nurse · After treatment position/precautions:  
o Up in chair 
o Bed/Chair-wheels locked 
o Bed in low position 
o Call light within reach 
o Family at bedside 
o Nurse at bedside · Compliance with Program/Exercises: Compliant all of the time · Recommendations/Intent for next treatment session: \"Next visit will focus on advancements to more challenging activities and reduction in assistance provided\". Total Treatment Duration: PT Patient Time In/Time Out Time In: 1027 Time Out: 5495 Amy Lam, PTA

## 2019-05-18 NOTE — PROGRESS NOTES
Orthopedic Progress Note May 18, 2019 Admit Date: 2019 Admit Diagnosis: Lumbar stenosis with neurogenic claudication [M48.062] Spinal stenosis [M48.00] Post Op day: 2 Days Post-Op Subjective:  
 
Michaela Piggs Dizzy when standing. Reports good UOP Objective:  
 
Vital Signs:   
Temp (24hrs), Av.2 °F (37.3 °C), Min:98.6 °F (37 °C), Max:99.8 °F (37.7 °C) LAB:   
[unfilled] Lab Results Component Value Date/Time INR 1.0 2014 10:53 AM  
 
Lab Results Component Value Date/Time HGB 10.3 (L) 2019 05:09 AM  
 HGB 11.7 (L) 10/22/2018 09:23 AM  
 
 
Physical Exam: 
 
No apparent distress No neurovascular issues reported No gross problems noted Dressing clean Plan:  
 
Continue PT/OT rehab as indicated Nursing and SS for disposition plans Ck orthostatics. Hold DC until clear & cleared by PT Signed By: Katie Benedict MD

## 2019-05-19 VITALS
DIASTOLIC BLOOD PRESSURE: 86 MMHG | OXYGEN SATURATION: 96 % | WEIGHT: 155.8 LBS | TEMPERATURE: 99.3 F | BODY MASS INDEX: 25.96 KG/M2 | SYSTOLIC BLOOD PRESSURE: 153 MMHG | HEART RATE: 74 BPM | RESPIRATION RATE: 17 BRPM | HEIGHT: 65 IN

## 2019-05-19 PROCEDURE — 99218 HC RM OBSERVATION: CPT

## 2019-05-19 PROCEDURE — 74011250637 HC RX REV CODE- 250/637: Performed by: ORTHOPAEDIC SURGERY

## 2019-05-19 RX ORDER — LEVOTHYROXINE SODIUM 100 UG/1
100 TABLET ORAL
Status: DISCONTINUED | OUTPATIENT
Start: 2019-05-19 | End: 2019-05-19 | Stop reason: HOSPADM

## 2019-05-19 RX ADMIN — PAROXETINE HYDROCHLORIDE 10 MG: 20 TABLET, FILM COATED ORAL at 08:48

## 2019-05-19 RX ADMIN — Medication 1 AMPULE: at 08:47

## 2019-05-19 RX ADMIN — LEVOTHYROXINE SODIUM 100 MCG: 100 TABLET ORAL at 05:40

## 2019-05-19 RX ADMIN — Medication 10 ML: at 05:42

## 2019-05-19 RX ADMIN — PANTOPRAZOLE SODIUM 40 MG: 40 TABLET, DELAYED RELEASE ORAL at 05:40

## 2019-05-19 RX ADMIN — FAMOTIDINE 20 MG: 20 TABLET ORAL at 08:48

## 2019-05-19 RX ADMIN — MONTELUKAST SODIUM 10 MG: 10 TABLET, FILM COATED ORAL at 08:48

## 2019-05-19 NOTE — DISCHARGE INSTRUCTIONS
Lumbar Laminectomy for Spinal Stenosis: What to Expect at [address] can expect your back to feel stiff or sore after surgery. This should improve in the weeks after surgery. You may have trouble sitting or standing in one position for very long and may need pain medicine in the weeks after your surgery. Your doctor may advise you to work with a physical therapist to strengthen the muscles around your spine and trunk. You will need to learn how to lift, twist, and bend so that you do not put too much strain on your back. This care sheet gives you a general idea about how long it will take for you to recover. But each person recovers at a different pace. Follow the steps below to get better as quickly as possible. How can you care for yourself at home? Activity  · Rest when you feel tired. Getting enough sleep will help you recover. · Try to walk each day. Start by walking a little more than you did the day before. Bit by bit, increase the amount you walk. Walking boosts blood flow and helps prevent pneumonia and constipation. Walking may also decrease your muscle soreness after surgery. · If advised by your doctor, you may need to avoid lifting anything that would cause excessive strain on your back. This may include a child, heavy grocery bags and milk containers, a heavy briefcase or backpack, cat litter or dog food bags, or a vacuum . · Avoid strenuous activities, such as bicycle riding, jogging, weight lifting, or aerobic exercise, until your doctor says it is okay. · Do not drive for 2 to 4 weeks after your surgery or until your doctor says it is okay. · Avoid riding in a car for more than 30 minutes at a time for 2 to 4 weeks after surgery. If you must ride in a car for a longer distance, stop often to walk and stretch your legs. · Try to change your position about every 30 minutes while sitting or standing. This will help decrease your back pain while you are healing.   · You will probably need to take 4 to 6 weeks off from work. It depends on the type of work you do and how you feel. · You may have sex as soon as you feel able, but avoid positions that put stress on your back or cause pain. Diet  · You can eat your normal diet. If your stomach is upset, try bland, low-fat foods like plain rice, broiled chicken, toast, and yogurt. · Drink plenty of fluids (unless your doctor tells you not to). · You may notice that your bowel movements are not regular right after your surgery. This is common. Try to avoid constipation and straining with bowel movements. You may want to take a fiber supplement every day. If you have not had a bowel movement after a couple of days, ask your doctor about taking a mild laxative. Medicines  · Take pain medicines exactly as directed. ¨ If the doctor gave you a prescription medicine for pain, take it as prescribed. ¨ If you are not taking a prescription pain medicine, ask your doctor if you can take an over-the-counter medicine. · If your doctor prescribed antibiotics, take them as directed. Do not stop taking them just because you feel better. You need to take the full course of antibiotics. · If you think your pain medicine is making you sick to your stomach:  ¨ Take your medicine after meals (unless your doctor has told you not to). ¨ Ask your doctor for a different pain medicine. Incision care  · If you have strips of tape on the cut (incision) the doctor made, leave the tape on for a week or until it falls off. · Wash the area daily with warm, soapy water and pat it dry. · Keep the area clean and dry. You may cover it with a gauze bandage if it weeps or rubs against clothing. Change the bandage every day. Exercise  · Do back exercises as instructed by your doctor. · Your doctor may advise you to work with a physical therapist to improve the strength and flexibility of your back.   Other instructions  · Use a heating pad, a warm water bottle, or a warm cloth on your back to reduce stiffness. Do not go to sleep with a heating pad on your skin. Put a thin cloth between the heating pad and your skin. Follow-up care is a key part of your treatment and safety. Be sure to make and go to all appointments, and call your doctor if you are having problems. Its also a good idea to know your test results and keep a list of the medicines you take. When should you call for help? Call 911 anytime you think you may need emergency care. For example, call if:  · You passed out (lost consciousness). · You have sudden chest pain and shortness of breath, or you cough up blood. · You lose bladder or bowel control. · One or both legs suddenly feel weak or numb. Call your doctor now or seek immediate medical care if:  · You have pain that does not get better after you take pain pills. · You have a headache that does not get better when you take medicine for it. · You have loose stitches, or your incision comes open. · You have blood or fluid draining from the incision. · You have signs of infection, such as:  ¨ Increased pain, swelling, warmth, or redness. ¨ Pus draining from the incision. ¨ A fever. ¨ Red streaks leading from the incision. Watch closely for any changes in your health, and be sure to contact your doctor if:  · You have new numbness or tingling in your legs. · You have new pain or weakness in your legs. · You do not have a bowel movement after taking a laxative. Where can you learn more? Go to [employer]  Enter L221 in the search box to learn more about \"Lumbar Laminectomy for Spinal Stenosis: What to Expect at Home. \"   © 5434-2034 [employer]. Care instructions adapted under license by [facility] [employer] (which disclaims liability or warranty for this information).  This care instruction is for use with your licensed healthcare professional. If you have questions about a medical condition or this instruction, always ask your healthcare professional. [place] [address] any warranty or liability for your use of this information. Content Version: 1.9.184175; Last Revised: March 13, 2012      DISCHARGE SUMMARY from Nurse    PATIENT INSTRUCTIONS:    After general anesthesia or intravenous sedation, for 24 hours or while taking prescription Narcotics:  · Limit your activities  · Do not drive and operate hazardous machinery  · Do not make important personal or business decisions  · Do  not drink alcoholic beverages  · If you have not urinated within 8 hours after discharge, please contact your surgeon on call. Report the following to your surgeon:  · Excessive pain, swelling, redness or odor of or around the surgical area  · Temperature over 100.5  · Nausea and vomiting lasting longer than 4 hours or if unable to take medications  · Any signs of decreased circulation or nerve impairment to extremity: change in color, persistent  numbness, tingling, coldness or increase pain  · Any questions    What to do at Home:  Recommended activity: Activity as tolerated,     If you experience any of the following symptoms fever greater then 100.5, pain unrelieved by medication, increase in shortness of breath, please follow up with primary care doctor. *  Please give a list of your current medications to your Primary Care Provider. *  Please update this list whenever your medications are discontinued, doses are      changed, or new medications (including over-the-counter products) are added. *  Please carry medication information at all times in case of emergency situations. These are general instructions for a healthy lifestyle:    No smoking/ No tobacco products/ Avoid exposure to second hand smoke  Surgeon General's Warning:  Quitting smoking now greatly reduces serious risk to your health.     Obesity, smoking, and sedentary lifestyle greatly increases your risk for illness    A healthy diet, regular physical exercise & weight monitoring are important for maintaining a healthy lifestyle    You may be retaining fluid if you have a history of heart failure or if you experience any of the following symptoms:  Weight gain of 3 pounds or more overnight or 5 pounds in a week, increased swelling in our hands or feet or shortness of breath while lying flat in bed. Please call your doctor as soon as you notice any of these symptoms; do not wait until your next office visit. Recognize signs and symptoms of STROKE:    F-face looks uneven    A-arms unable to move or move unevenly    S-speech slurred or non-existent    T-time-call 911 as soon as signs and symptoms begin-DO NOT go       Back to bed or wait to see if you get better-TIME IS BRAIN. Warning Signs of HEART ATTACK     Call 911 if you have these symptoms:   Chest discomfort. Most heart attacks involve discomfort in the center of the chest that lasts more than a few minutes, or that goes away and comes back. It can feel like uncomfortable pressure, squeezing, fullness, or pain.  Discomfort in other areas of the upper body. Symptoms can include pain or discomfort in one or both arms, the back, neck, jaw, or stomach.  Shortness of breath with or without chest discomfort.  Other signs may include breaking out in a cold sweat, nausea, or lightheadedness. Don't wait more than five minutes to call 911 - MINUTES MATTER! Fast action can save your life. Calling 911 is almost always the fastest way to get lifesaving treatment. Emergency Medical Services staff can begin treatment when they arrive -- up to an hour sooner than if someone gets to the hospital by car. The discharge information has been reviewed with the patient. The patient verbalized understanding.   Discharge medications reviewed with the patient and appropriate educational materials and side effects teaching were provided.   ___________________________________________________________________________________________________________________________________

## 2019-05-19 NOTE — PROGRESS NOTES
Discharge instructions and prescriptions given and explained to pt. Pt verbalized understanding. Iv removed x 1 . Pt to be discharged home.

## 2019-05-28 NOTE — DISCHARGE SUMMARY
Discharge Summary    Patient ID:Ewa Fonseca  [id number]  1950  78 y.o. Admit date: 5/16/2019    Discharge date:5/18/2019    PREOPERATIVE DIAGNOSIS:  Lumbar spinal stenosis L3, L4, L5 and S1.     POSTOPERATIVE DIAGNOSIS:  Lumbar spinal stenosis L3, L4, L5 and S1.     PROCEDURES PERFORMED:  Bilateral L3, L4, L5 and S1 laminectomy, partial facetectomy, foraminotomy - CPT codes 61492, 98771 x3.     SURGEON:  Elias Thompson MD     ASSISTANT:  Staff     ANESTHESIA:  GETA.     COMPLICATIONS:  None.     SPECIMENS REMOVED:  None.     IMPLANTS:  None.     ESTIMATED BLOOD LOSS:  200 mL.     FLUIDS:  A liter of crystalloid.     DRAINS:  One Hemovac.     INDICATIONS:  The patient is a 59-year-old female with multilevel spinal stenosis. She is having right leg pain greater than the left and she has got only temporary relief with epidural blocks, activity restriction, pain medication. So, she is brought for surgical treatment.       Hospital Course: Patient admitted to ortho floor. Antibiotics were given postop. SCD and jame hose were in place for DVT prophylaxis. Goncalves catheter was in place until POD#1 then discontinued. Patient did not receive blood transfusion. The patient tolerated pain medications and po diet. The hemovac drain output gradually diminished and was then removed. Dressing remained clean, dry, and intact. Physical Therapy started on the day following surgery and progressed to ambulation without assistance. At the time of discharge, the patient had understanding of precautions needed following surgery. Postoperative complications requiring extended length of stay: None    Discharged to: Home    New Medications: oxycodone    Discharge instructions:  -Resume pre hospital diet            -Resume home medications per medical continuation form     -Follow up in office as scheduled   -Call doctor immediately if T>100.5, increased pain, swelling, drainage.   -If shortness of breath or chest pain, immediately go to ER  -Post surgical instruction sheet given to patient    Signed:  ANNY Nunez  5/28/2019

## 2019-07-13 NOTE — THERAPY EVALUATION
Marita Jernigan  : 1950  [place] / [place] / Product Type: Medicare /  [facility]  [address], [address]  [phone]   [phone]      OUTPATIENT PHYSICAL THERAPY:Discontinuation Summary 2019    ICD-10: Treatment Diagnosis:   M54.41 Lumbago with sciatica, right side     M54.42 Lumbago with sciatica, left side   Precautions/Allergies:   Hydrocodone-acetaminophen and Sulfa (sulfonamide antibiotics)   Fall Risk Score: 0 (? 5 = High Risk)  MD Orders: Evaluate and treat. MEDICAL/REFERRING DIAGNOSIS:  Spinal stenosis, lumbar region with neurogenic claudication [M48.062]   DATE OF ONSET: 2019  REFERRING PHYSICIAN: Ce Garcia PA  RETURN PHYSICIAN APPOINTMENT: TBD     INITIAL ASSESSMENT:  Ms. Star Christie reported a recent onset of lumbago and intermittent bilateral LE radiculopathy in weight bearing postures. A recent MRI revealed lumbar DDD and stenosis, and she has been referred to physical therapy in an effort to decrease her symptoms. PROBLEM LIST (Impacting functional limitations):  1. Decreased Strength  2. Decreased ADL/Functional Activities  3. Increased Pain  4. Decreased Activity Tolerance INTERVENTIONS PLANNED:  1. Home Exercise Program (HEP)  2. Neuromuscular Re-education/Strengthening  3. Range of Motion (ROM)  4. Therapeutic Exercise/Strengthening   TREATMENT PLAN:  Effective Dates: 3/26/2019 TO 2019. Frequency/Duration: 2 times a week for 90 days  GOALS: (Goals have been discussed and agreed upon with patient.)                                           VOLUNTARY DC  Short-Term Functional Goals: Time Frame: 30 days. 1. Decrease Oswestry Index 4 points to allow improved ADLs with sitting and lifting. 2. Decrease AROM limits to moderate to decrease pain 1-2 levels. 3. Increase lumbar strength to decrease pain 1-2 levels. Discharge Goals: Time Frame: 90 days. .  1. Decrease Oswestry Index 8 points to allow progression to an independent home exercise program.  2. Decrease trunk AROM limits to minimal to decrease pain +2 levels. 3. Demonstrate independence in home exercises to allow DC from physical therapy. Rehabilitation Potential For Stated Goals: Good  Regarding Monie Chaudhari's therapy, I certify that the treatment plan above will be carried out by a therapist or under their direction. Thank you for this referral,  Georgena Eisenmenger., PT     Referring Physician Signature: ANNY Joseph              Date                    The information in this section was collected on 3/26/2019 (except where otherwise noted). HISTORY:   History of Present Injury/Illness (Reason for Referral): The patient reported a recent onset of lumbago and intermittent bilateral LE radiculopathy in weight bearing postures. A recent MRI revealed lumbar DDD and stenosis, and she has been referred to physical therapy in an effort to decrease her symptoms. Past Medical History/Comorbidities:   Ms. Sulma Urena  has a past medical history of Allergic rhinitis, cause unspecified, Anxiety, Arthritis, Asthma, Cancer ([address] (12/2014), Depression, Dyslipidemia, Former smoker, GERD (gastroesophageal reflux disease), Hyperglycemia, Hypertension, Nausea & vomiting, Subacute thyroiditis (2017), Thyroid cancer ([address] and Unspecified sleep apnea. She also has no past medical history of Adverse effect of anesthesia, Aneurysm ([address] Arrhythmia, Autoimmune disease ([address] CAD (coronary artery disease), Chronic kidney disease, Chronic obstructive pulmonary disease ([address] Coagulation disorder ([address] Difficult intubation, Endocarditis, Heart failure ([address] Ill-defined condition, Liver disease, Malignant hyperthermia due to anesthesia, Nicotine vapor product user, Non-nicotine vapor product user, Pseudocholinesterase deficiency, PUD (peptic ulcer disease), Rheumatic fever, Seizures ([address] Stroke ([address] or Thromboembolus ([address]   Ms. Sulma Urena  has a past surgical history that includes hx bunionectomy (Right, 2013); hx bunionectomy (Left, 2012); hx colonoscopy (2012); hx thyroidectomy (2018); hx gastrectomy (2015); hx jory and bso (2000); hx breast reduction (Bilateral, 2006); hx breast lumpectomy (Right); hx other surgical (Right, 1976); hx orthopaedic (Right, 2014); hx orthopaedic (Bilateral); hx orthopaedic (Left); hx orthopaedic (Bilateral); hx tonsillectomy (2000); hx adenoidectomy; and hx heent (02/15/2018). Social History/Living Environment:    Independent  Prior Level of Function/Work/Activity:  Retired  Dominant Side:         RIGHT  Current Medications:       Current Outpatient Medications:     omeprazole (PRILOSEC) 10 mg capsule, Take 10 mg by mouth two (2) times a day., Disp: , Rfl:     ferrous fumarate/vit Bcomp,C (SUPER B COMPLEX PO), Take  by mouth., Disp: , Rfl:     diphenhydrAMINE (BENADRYL) 25 mg capsule, Take 25 mg by mouth every six (6) hours as needed. , Disp: , Rfl:     levothyroxine (SYNTHROID) 100 mcg tablet, Take 1 Tab by mouth Daily (before breakfast). , Disp: 90 Tab, Rfl: 3    montelukast (SINGULAIR) 10 mg tablet, Take 1 Tab by mouth daily. (Patient taking differently: Take 10 mg by mouth every morning. Indications: inflammation of the nose due to an allergy), Disp: 90 Tab, Rfl: 3    PARoxetine (PAXIL) 10 mg tablet, Take 1 Tab by mouth daily. (Patient taking differently: Take 10 mg by mouth every morning.), Disp: 90 Tab, Rfl: 1    varicella-zoster recombinant, PF, (SHINGRIX, PF,) 50 mcg/0.5 mL susr injection, 0.5mL by IntraMUSCular route once now and then repeat in 2-6 months, Disp: 0.5 mL, Rfl: 1    B.infantis-B.ani-B.long-B.bifi (PROBIOTIC 4X) 10-15 mg TbEC, Take 1 Cap by mouth two (2) times a day., Disp: , Rfl:     aspirin delayed-release 81 mg tablet, Take 81 mg by mouth daily. Last dose will be 5/9/2019, Disp: , Rfl:     multivitamin (ONE A DAY) tablet, Take 1 tablet by mouth daily. , Disp: , Rfl:     cholecalciferol (VITAMIN D3) 1,000 unit tablet, Take 1,000 Units by mouth daily. , Disp: , Rfl:     biotin 2,500 mcg tab, Take 1 Tab by mouth two (2) times a day., Disp: , Rfl:    Date Last Reviewed:  4/9/2019   Number of Personal Factors/Comorbidities that affect the Plan of Care: 3+: HIGH COMPLEXITY   EXAMINATION:   Observation/Orthostatic Postural Assessment:    Mildly kyphotic. Palpation:  Tenderness with palpation to the patient's lumbar spine. ROM: TRUNK AROM   Extension     Severe Limit   Flexion          Minimal Limit   Rotations      Moderate Limit                 Strength: LUMBAR SPINE   +3/5 abdominal strength   -4/5 R LE Strength    4/5 L LE Strength           Special Tests: N/A  Neurological Screen: SLR negative (B), but does experience bilateral LE radiculopathy with prolonged weight bearing activities. Functional Mobility: Independent   Balance:  Good. Body Structures Involved:  1. Bones  2. Joints  3. Muscles Body Functions Affected:  1. Sensory/Pain  2. Neuromusculoskeletal  3. Movement Related Activities and Participation Affected:  1. General Tasks and Demands  2. Mobility  3. Community, Social and [place]   Number of elements (examined above) that affect the Plan of Care: 4+: HIGH COMPLEXITY   CLINICAL PRESENTATION:   Presentation: Evolving clinical presentation with changing clinical characteristics: MODERATE COMPLEXITY   CLINICAL DECISION MAKING:   Outcome Measure: Tool Used: Modified Oswestry Low Back Pain Questionnaire  Score:  Initial: 14/50  Most Recent: X/50 (Date: -- )   Interpretation of Score: Each section is scored on a 0-5 scale, 5 representing the greatest disability. The scores of each section are added together for a total score of 50. Medical Necessity:   · Patient demonstrates good rehab potential due to higher previous functional level.   Reason for Services/Other Comments:  · Patient continues to require skilled intervention due to her limited ability to perform weight bearing ADLs due to lumbago and bilateral sciatica. .   Use of outcome tool(s) and clinical judgement create a POC that gives a: Questionable prediction of patient's progress: MODERATE COMPLEXITY            TREATMENT:   (In addition to Assessment/Re-Assessment sessions the following treatments were rendered)  Pre-treatment Symptoms/Complaints:  The patient reported a recent onset of lumbago and intermittent bilateral LE radiculopathy in weight bearing postures. A recent MRI revealed lumbar DDD and stenosis, and she has been referred to physical therapy in an effort to decrease her symptoms. Pain: Initial:   Pain Intensity 1: 6  Post Session:  8   Therapeutic Exercise: ( 25): The patient received treatment as below to improve mobility, strength and balance. Required moderate verbal and manual cues to promote proper body alignment, promote proper body posture and promote proper body mechanics. Progressed resistance, range and repetitions as indicated. The patient received exercise instruction followed by patient demonstration of the exercises to include abdominals in supine to include single hip and knee extension, and posterior pelvic tilts. Also hip flexor flexibility exercises to stabilize the lumbar spine and improved AROM to decrease pain and spasms allowing improved function. [facility] Portal  Evaluation: ( X )  Manual Therapy (     ):   Therapeutic Modalities:                                                                                               HEP: As above; handouts given to patient for all exercises. Treatment/Session Assessment:    · Response to Treatment: The patient tolerated today's treatment without symptom exacerbation. The patient demonstrated independence with the initial home exercises and is to perform the exercises in conjunction with continued physical therapy.   The patient should progress to an independent home exercise program within a few weeks  · Compliance with Program/Exercises: Will assess as treatment progresses. · Recommendations/Intent for next treatment session: \"Next visit will focus on advancements to more challenging activities\".   Total Treatment Duration:  PT Patient Time In/Time Out  Time In: 1300  Time Out: [address] [place] [address]., PT

## 2019-07-23 ENCOUNTER — HOSPITAL ENCOUNTER (OUTPATIENT)
Dept: MAMMOGRAPHY | Age: 69
Discharge: HOME | End: 2019-07-23
Attending: FAMILY MEDICINE
Payer: MEDICARE

## 2019-07-23 DIAGNOSIS — Z12.31 VISIT FOR SCREENING MAMMOGRAM: ICD-10-CM

## 2019-07-23 PROCEDURE — 77063 BREAST TOMOSYNTHESIS BI: CPT

## 2019-09-05 PROBLEM — K21.9 GERD (GASTROESOPHAGEAL REFLUX DISEASE): Status: ACTIVE | Noted: 2019-09-05

## 2019-09-05 PROBLEM — G47.30 SLEEP APNEA: Status: ACTIVE | Noted: 2019-09-05

## 2019-09-05 PROBLEM — H60.11: Status: ACTIVE | Noted: 2019-07-16

## 2019-09-05 PROBLEM — J30.9 ALLERGIC RHINITIS: Status: ACTIVE | Noted: 2019-09-05

## 2019-09-05 PROBLEM — R68.84 JAW PAIN: Status: ACTIVE | Noted: 2019-04-29

## 2019-09-05 PROBLEM — Z86.39 HISTORY OF HYPERGLYCEMIA: Status: ACTIVE | Noted: 2019-09-05

## 2019-09-05 PROBLEM — Z98.890 S/P THYROID SURGERY: Status: ACTIVE | Noted: 2018-02-15

## 2019-09-05 PROBLEM — R30.0 DYSURIA: Status: ACTIVE | Noted: 2019-04-29

## 2019-09-05 PROBLEM — J45.20 MILD INTERMITTENT ASTHMA: Status: ACTIVE | Noted: 2019-09-05

## 2019-09-05 PROBLEM — C73 THYROID CANCER (HCC): Status: ACTIVE | Noted: 2019-09-05

## 2019-09-17 ENCOUNTER — HOSPITAL ENCOUNTER (OUTPATIENT)
Dept: PHYSICAL THERAPY | Age: 69
Discharge: HOME | End: 2019-09-17
Payer: MEDICARE

## 2019-09-17 PROCEDURE — 97110 THERAPEUTIC EXERCISES: CPT

## 2019-09-17 PROCEDURE — 97162 PT EVAL MOD COMPLEX 30 MIN: CPT

## 2019-09-17 NOTE — PROGRESS NOTES
Ayad Ledesma  : 1950  [place]  Secondary: Mckinley Powers at [place] [address], [address] [facility]  [phone]   [phone]        OUTPATIENT PHYSICAL THERAPY: Daily Treatment Note 2019  Visit Count:  1    ICD-10: Treatment Diagnosis: Low back pain (M54.5), Pain in right hip (M25.551)  Precautions/Allergies:   Hydrocodone-acetaminophen and Sulfa (sulfonamide antibiotics)   TREATMENT PLAN:  Effective Dates: 2019 TO 2019 (60 days). Frequency/Duration: 2 times a week for 60 Day(s)    Pre-treatment Symptoms/Complaints:  Patient says her hip pain has been consistent the past few months. Pain: Initial: Pain Intensity 1: 5 /10 Post Session:  4/10   Medications Last Reviewed:  2019  Updated Objective Findings:  See evaluation note from today  TREATMENT:   Therapeutic Exercise: (15 Minutes):  Exercises per grid below to improve mobility and strength. Required moderate visual, verbal, manual and tactile cues to promote proper body alignment, promote proper body posture and promote proper body mechanics. Progressed resistance, range, repetitions and complexity of movement as indicated. Date:  2019     Activity/Exercise Parameters   Patient Education Plan of care, HEP   LE rocking 1 min   Rocking; legs crossed 1 min each   Hip isometric Belt 5 s x 10   Hip band drop out One leg; 2 x 10 each side   Posterior pelvic tilt x10           Treatment/Session Summary:    · Response to Treatment:  Patient found to have weakness at hip rotators and palpation to area reproduces some of her symptoms. She has good tolerance to initial HEP tasks without complaints of pain. · Communication/Consultation:  None today  · Equipment provided today:  None today  · Recommendations/Intent for next treatment session: Next visit will focus on improving hip strength, mobility and decreasing pain.     Total Treatment Billable Duration:  15 minutes (45 minute evaluation)  PT Patient Time In/Time Out  Time In: 1030  Time Out: [address]  [place]    Future Appointments   Date Time Provider Padmini Khalil   9/19/2019 10:30 AM Kimberlibelinda Pool [facility]   9/24/2019 10:30 AM Reed Wright Hermelinda [place] [facility]   9/26/2019 10:30 AM Shruti Pool [facility]   1/6/2020  1:30 PM [facility]   1/20/2020  3:00 PM Fidel Soulier, MD END BS ENDO   8/27/2020  2:10 PM Joan Marlow MD [facility]

## 2019-09-17 NOTE — THERAPY EVALUATION
Gabriela Ramey  : 1950  [place]  Secondary: [facility]ckinley Powers at [facility], [address] [facility]  [phone]   [phone]          OUTPATIENT PHYSICAL THERAPY:Initial Assessment 2019   ICD-10: Treatment Diagnosis: Low back pain (M54.5), Pain in right hip (M25.551)  Precautions/Allergies:   Hydrocodone-acetaminophen and Sulfa (sulfonamide antibiotics)   TREATMENT PLAN:  Effective Dates: 2019 TO 2019 (60 days). Frequency/Duration: 2 times a week for 60 Day(s) MEDICAL/REFERRING DIAGNOSIS:  Trochanteric bursitis, right hip [M70.61]   DATE OF ONSET: Chronic  REFERRING PHYSICIAN: Severo Hill MD MD Orders: Evaluate and Treat  Return MD Appointment: TBD     INITIAL ASSESSMENT:  Ms. Daryl Hollis presents with chronic low back and right hip pain. Her chief complaint is right hip pain following lumbar surgery in May 2019 but also presents with decreased hip, pelvic and lumbar mobility and strength which limits her ability to perform ADL tasks without pain or difficulty. She will benefit from skilled therapy to improve her pain, strength and mobility to return to highest level of function possible. PROBLEM LIST (Impacting functional limitations):  1. Decreased Strength  2. Decreased ADL/Functional Activities  3. Decreased Ambulation Ability/Technique  4. Decreased Balance  5. Increased Pain  6. Decreased Activity Tolerance  7. Decreased Flexibility/Joint Mobility  8. Decreased [place] with Home Exercise Program INTERVENTIONS PLANNED: (Treatment may consist of any combination of the following)  1. Balance Exercise  2. Gait Training  3. Home Exercise Program (HEP)  4. Manual Therapy  5. Neuromuscular Re-education/Strengthening  6. Range of Motion (ROM)  7.  Therapeutic Exercise/Strengthening     GOALS: (Goals have been discussed and agreed upon with patient.)  Discharge Goals: Time Frame: 60 days  1. Patient will be independent with home exercise program without assistance from therapist.   2. Patient will report KASSI score to 10/50 or less to demonstrate improved functional capacity. 3. Patient will demonstrate ability to stand on right LE without pain to resume normal ADL function. 4. Patient will perform walking and stairs without dfficulty to demonstrate improved functional mobility. 5. Patient will perform 30 sit to stands without complaints of pain to resume normal recreational tasks with less pain/difficulty. OUTCOME MEASURE:   Tool Used: Modified Oswestry Low Back Pain Questionnaire  Score:  Initial: 26/50 (9/17/19)  Most Recent: X/50 (Date: -- )   Interpretation of Score: Each section is scored on a 0-5 scale, 5 representing the greatest disability. The scores of each section are added together for a total score of 50. MEDICAL NECESSITY:   · Patient is expected to demonstrate progress in strength, range of motion and balance to increase independence with ADL tasks. REASON FOR SERVICES/OTHER COMMENTS:  · Patient continues to require present interventions due to patient's inability to walk and stand without pain. Total Duration:  PT Patient Time In/Time Out  Time In: 1030  Time Out: 1130    Rehabilitation Potential For Stated Goals: Good       PAIN/SUBJECTIVE:   Initial: Pain Intensity 1: 5/10 Post Session:  4/10   HISTORY:   History of Injury/Illness (Reason for Referral): Nelly Skiff presents with chronic history of right hip and low back pain. She believes her pain has stemmed from a fall she sustained 5 years ago where she injured her right knee and left shoulder. Earlier this year she began having low back and right hip pain where she had MRI imaging which revealed spinal stenosis. She then underwent several epidural injections followed by PT and eventually underwent spinal surgery on levels L3-S1.  However, she continued to experience right hip pain and has no undergone a subsequent MRI on that area with no significant findings but states her hip symptoms have been largely unchanged over the past few months. Her goal is to resume normal activity without pain. Past Medical History/Comorbidities:   Ms. Daniela Dickens  has a past medical history of Allergic rhinitis, cause unspecified, Anxiety, Arthritis, Asthma, Cancer ([address] (12/2014), Depression, Dyslipidemia, Former smoker, GERD (gastroesophageal reflux disease), Hyperglycemia, Hypertension, Nausea & vomiting, Subacute thyroiditis (2017), Thyroid cancer ([address] and Unspecified sleep apnea. She also has no past medical history of Adverse effect of anesthesia, Aneurysm ([address] Arrhythmia, Autoimmune disease ([address] Breast cancer ([address] CAD (coronary artery disease), Chronic kidney disease, Chronic obstructive pulmonary disease ([address] Coagulation disorder ([address] Difficult intubation, Endocarditis, Heart failure ([address] Ill-defined condition, Liver disease, Malignant hyperthermia due to anesthesia, Nicotine vapor product user, Non-nicotine vapor product user, Pseudocholinesterase deficiency, PUD (peptic ulcer disease), Rheumatic fever, Seizures ([address] Stroke ([address] or Thromboembolus ([address] Ms. Daniela Dickens  has a past surgical history that includes hx bunionectomy (Right, 2013); hx bunionectomy (Left, 2012); hx colonoscopy (2012); hx thyroidectomy (2018); hx gastrectomy (2015); hx jory and bso (2000); hx breast reduction (Bilateral, 2006); hx breast lumpectomy (Right); hx other surgical (Right, 1976); hx orthopaedic (Right, 2014); hx orthopaedic (Bilateral); hx orthopaedic (Left); hx orthopaedic (Bilateral); hx tonsillectomy (2000); hx adenoidectomy; hx heent (02/15/2018); and hx back surgery (05/16/2019). Social History/Living Environment:     Lives in private setting home, no barriers to progress.    Prior Level of Function/Work/Activity:  Retired  Previous Treatment Approaches:          Spinal surgery in May 2019; PT in April/May 2019   Ambulatory/Rehab Services H2 Model Falls Risk Assessment   Risk Factors:       No Risk Factors Identified Ability to Rise from Chair:       (0)  Ability to rise in a single movement   Falls Prevention Plan:       No modifications necessary   Total: (5 or greater = High Risk): 0   ©2010 [facility] [employer]. All Rights Reserved. Emma Beckham Patent #[id number]. Federal Law prohibits the replication, distribution or use without written permission from [facility] [employer]   Current Medications:       Current Outpatient Medications:     levothyroxine (SYNTHROID) 100 mcg tablet, Take 1 Tab by mouth Daily (before breakfast). , Disp: 90 Tab, Rfl: 3    omeprazole (PRILOSEC) 10 mg capsule, Take 10 mg by mouth two (2) times a day., Disp: , Rfl:     diphenhydrAMINE (BENADRYL) 25 mg capsule, Take 25 mg by mouth every six (6) hours as needed. , Disp: , Rfl:     montelukast (SINGULAIR) 10 mg tablet, Take 1 Tab by mouth daily. (Patient taking differently: Take 10 mg by mouth every morning. Indications: inflammation of the nose due to an allergy), Disp: 90 Tab, Rfl: 3    PARoxetine (PAXIL) 10 mg tablet, Take 1 Tab by mouth daily. (Patient taking differently: Take 10 mg by mouth every morning.), Disp: 90 Tab, Rfl: 1    varicella-zoster recombinant, PF, (SHINGRIX, PF,) 50 mcg/0.5 mL susr injection, 0.5mL by IntraMUSCular route once now and then repeat in 2-6 months, Disp: 0.5 mL, Rfl: 1    B.infantis-B.ani-B.long-B.bifi (PROBIOTIC 4X) 10-15 mg TbEC, Take 1 Cap by mouth two (2) times a day., Disp: , Rfl:     aspirin delayed-release 81 mg tablet, Take 81 mg by mouth daily. Last dose will be 5/9/2019, Disp: , Rfl:     multivitamin (ONE A DAY) tablet, Take 1 tablet by mouth daily. , Disp: , Rfl:     cholecalciferol (VITAMIN D3) 1,000 unit tablet, Take 1,000 Units by mouth daily. , Disp: , Rfl:     biotin 2,500 mcg tab, Take 1 Tab by mouth two (2) times a day., Disp: , Rfl:    Date Last Reviewed:  9/17/2019     Number of Personal Factors/Comorbidities that affect the Plan of Care: 1-2: MODERATE COMPLEXITY   EXAMINATION:   Observation/Gait Assessment:  Patient ambulates with modified Trendelenburg on right side with moderate antalgic pattern. Motion Testing:       RIGHT LEFT    Flexion DN DN    Extension DN DN    Side Bending DN DN    Rotation DN DN    Hip motion DN, painful IR DN          Strength:       RIGHT LEFT    Transverse Abdominis 4/5 4/5    Hip Extension 4/5 4/5    Hip Abduction 3/5 4/5          Joint Mobility:        RIGHT LEFT    Hip Hypomobile Normal          Flexibility:       RIGHT LEFT    Hip Rotators Moderate restriction; reproduces symptoms Normal    Hip Flexor Tender TFL Normal            Abbreviations: DP- dysfunctional painful, DN- dysfunctional nonpainful, FN- functional non painful, FP- functional painful     Body Structures Involved:  1. Nerves  2. Bones  3. Joints  4. Muscles Body Functions Affected:  1. Sensory/Pain  2. Neuromusculoskeletal  3. Movement Related Activities and Participation Affected:  1. General Tasks and Demands  2. Mobility  3. Self Care  4.  Community, Social and [place]   Number of elements (examined above) that affect the Plan of Care: 4+: HIGH COMPLEXITY   CLINICAL PRESENTATION:   Presentation: Evolving clinical presentation with changing clinical characteristics: MODERATE COMPLEXITY   CLINICAL DECISION MAKING:   Use of outcome tool(s) and clinical judgement create a POC that gives a: Questionable prediction of patient's progress: MODERATE COMPLEXITY

## 2019-09-19 ENCOUNTER — HOSPITAL ENCOUNTER (OUTPATIENT)
Dept: PHYSICAL THERAPY | Age: 69
Discharge: HOME | End: 2019-09-19
Payer: MEDICARE

## 2019-09-19 PROCEDURE — 97140 MANUAL THERAPY 1/> REGIONS: CPT

## 2019-09-19 PROCEDURE — 97110 THERAPEUTIC EXERCISES: CPT

## 2019-09-19 NOTE — PROGRESS NOTES
Tariq Boateng  : 1950  [place]  Secondary: Mckinley Powers at [facility], [address] [facility]  [phone]   [phone]        OUTPATIENT PHYSICAL THERAPY: Daily Treatment Note 2019  Visit Count:  2    ICD-10: Treatment Diagnosis: Low back pain (M54.5), Pain in right hip (M25.551)  Precautions/Allergies:   Hydrocodone-acetaminophen and Sulfa (sulfonamide antibiotics)   TREATMENT PLAN:  Effective Dates: 2019 TO 2019 (60 days). Frequency/Duration: 2 times a week for 60 Day(s)    Pre-treatment Symptoms/Complaints:  Patient reports noticing some hip pain relief since her first visit. Pain: Initial: Pain Intensity 1: 3 10 Post Session:  2/10   Medications Last Reviewed:  2019  Updated Objective Findings:  None Today  TREATMENT:   Therapeutic Exercise: (45 Minutes):  Exercises per grid below to improve mobility and strength. Required moderate visual, verbal, manual and tactile cues to promote proper body alignment, promote proper body posture and promote proper body mechanics. Progressed resistance, range, repetitions and complexity of movement as indicated. Date:  2019     Activity/Exercise Parameters   Patient Education Plan of care, HEP   LE rocking 1 min x 2   Rocking; legs crossed 1 min each   Hip isometric Belt 5 s x 10   Hip band drop out One leg; 2 x 10 each side   Posterior pelvic tilt 2 x 10   Clam shell 2 x 10   3-way hip 2 x 5 each side   Side stepping 10 ft x 10   Abdominal brace with    Gait training 3 laps   Bike  5 min     Manual Therapy (    Soft Tissue Mobilization Duration  Duration: 10 Minutes): Manual techniques to facilitate improved motion and decreased pain.  (Used abbreviations: MET - muscle energy technique; PNF - proprioceptive neuromuscular facilitation; NMR - neuromuscular re-education; a/p - anterior to posterior; p/a - posterior to anterior) · Long axis hip distraction, right LE  · Soft tissue mobilization, hip rotators       Treatment/Session Summary:    · Response to Treatment:  Patient has great tolerance to activity today and reports less hip pain with standing and walking at end of session. · Communication/Consultation:  None today  · Equipment provided today:  None today  · Recommendations/Intent for next treatment session: Next visit will focus on improving hip strength, mobility and decreasing pain. Total Treatment Billable Duration:  55 minutes  PT Patient Time In/Time Out  Time In: 1030  Time Out: 234 [facility].  [facility]    Future Appointments   Date Time Provider Padmini Khalil   9/24/2019 10:30 AM Irina Cat [facility]   1/6/2020  1:30 PM [facility] US LOGIC 9 [facility]   1/20/2020  3:00 PM Elizabeth Cat MD END BS ENDO   8/27/2020  2:10 PM MD MARIBELL Siddiqui

## 2019-09-24 ENCOUNTER — HOSPITAL ENCOUNTER (OUTPATIENT)
Dept: PHYSICAL THERAPY | Age: 69
Discharge: HOME | End: 2019-09-24
Payer: MEDICARE

## 2019-09-24 PROCEDURE — 97110 THERAPEUTIC EXERCISES: CPT

## 2019-09-24 NOTE — PROGRESS NOTES
Michael Smith  : 1950  [place]  Secondary: Mckinley Powers at [place] [address], [address] [facility]  [phone]   [phone]        OUTPATIENT PHYSICAL THERAPY: Daily Treatment Note 2019  Visit Count:  3    ICD-10: Treatment Diagnosis: Low back pain (M54.5), Pain in right hip (M25.551)  Precautions/Allergies:   Hydrocodone-acetaminophen and Sulfa (sulfonamide antibiotics)   TREATMENT PLAN:  Effective Dates: 2019 TO 2019 (60 days). Frequency/Duration: 2 times a week for 60 Day(s)    Pre-treatment Symptoms/Complaints:  Patient says her hip was very sore over the weekend but feels some better today. Pain: Initial: Pain Intensity 1: 4 10 Post Session:  2/10   Medications Last Reviewed:  2019  Updated Objective Findings:  None Today  TREATMENT:   Therapeutic Exercise: (55 Minutes):  Exercises per grid below to improve mobility and strength. Required moderate visual, verbal, manual and tactile cues to promote proper body alignment, promote proper body posture and promote proper body mechanics. Progressed resistance, range, repetitions and complexity of movement as indicated. Date:  2019     Activity/Exercise Parameters   Patient Education Plan of care, HEP   LE rocking 1 min x 2   Rocking; legs crossed 1 min each   Hip isometric --   Hip band drop out One leg; 2 x 10 each side   Posterior pelvic tilt --   Clam shell --   3-way hip 2 x 5 each side   Side stepping 10 ft x 10   Backwards walking 10 ft x 8   Gait training 3 laps   Bike  5 min   Sit to stand 2 x 10, R LE emphasis      Manual Therapy (     ): Manual techniques to facilitate improved motion and decreased pain.  (Used abbreviations: MET - muscle energy technique; PNF - proprioceptive neuromuscular facilitation; NMR - neuromuscular re-education; a/p - anterior to posterior; p/a - posterior to anterior)   · Long axis hip distraction, right LE (not performed today)  · Soft tissue mobilization, hip rotators (not performed today)      Treatment/Session Summary:    · Response to Treatment:  Patient has some discomfort with active hip abduction and ER but is able to tolerate exercises without increasing pain today. She reports some decreased stiffness in her back and hip while walking at end of session. · Communication/Consultation:  None today  · Equipment provided today:  None today  · Recommendations/Intent for next treatment session: Next visit will focus on improving hip strength, mobility and decreasing pain. Total Treatment Billable Duration:  55 minutes  PT Patient Time In/Time Out  Time In: 1030  Time Out: 234 [facility].  [facility]    Future Appointments   Date Time Provider Padmini Khalil   10/16/2019 10:00 AM Unique Seal [facility]   10/22/2019 10:30 AM Roc Suresh [facility]   10/24/2019 10:30 AM Unique Seal [facility]   1/6/2020  1:30 PM [facility] US LOGIC 9 [facility]   1/20/2020  3:00 PM Bret Henson MD END BS ENDO   8/27/2020  2:10 PM MD MARIBELL Damico Aas

## 2019-09-26 ENCOUNTER — APPOINTMENT (OUTPATIENT)
Dept: PHYSICAL THERAPY | Age: 69
End: 2019-09-26
Payer: MEDICARE

## 2019-10-16 ENCOUNTER — HOSPITAL ENCOUNTER (OUTPATIENT)
Dept: PHYSICAL THERAPY | Age: 69
Discharge: HOME | End: 2019-10-16

## 2019-10-16 NOTE — PROGRESS NOTES
Therapy Center at [address] [place] [address] Guevara Manzo   [phone]   [phone]    DATE: 10/16/2019    Patient cancelled appointment today due to needing to care for her mother. Will plan to follow up on next scheduled visit.       Baylee Martinez, PT, DPT

## 2019-10-22 ENCOUNTER — HOSPITAL ENCOUNTER (OUTPATIENT)
Dept: PHYSICAL THERAPY | Age: 69
End: 2019-10-22

## 2019-10-24 ENCOUNTER — APPOINTMENT (OUTPATIENT)
Dept: PHYSICAL THERAPY | Age: 69
End: 2019-10-24

## 2019-12-09 NOTE — THERAPY DISCHARGE
Harden Kanner  : 1950  [place]  Secondary: Mckinley Powers at [facility], [address]  [phone]   [phone]          OUTPATIENT PHYSICAL THERAPY:Discontinuation Summary 19   ICD-10: Treatment Diagnosis: Low back pain (M54.5), Pain in right hip (M25.551)  Precautions/Allergies:   Hydrocodone-acetaminophen and Sulfa (sulfonamide antibiotics)   TREATMENT PLAN:  Effective Dates: 2019 TO 2019 (60 days). Frequency/Duration: 2 times a week for 60 Day(s) MEDICAL/REFERRING DIAGNOSIS:  Trochanteric bursitis, right hip [M70.61]   DATE OF ONSET: Chronic  REFERRING PHYSICIAN: Star Carter MD MD Orders: Evaluate and Treat  Return MD Appointment: TBD     INITIAL ASSESSMENT:  Ms. Monica Lambert presents with chronic low back and right hip pain. Her chief complaint is right hip pain following lumbar surgery in May 2019 but also presents with decreased hip, pelvic and lumbar mobility and strength which limits her ability to perform ADL tasks without pain or difficulty. She will benefit from skilled therapy to improve her pain, strength and mobility to return to highest level of function possible. PROBLEM LIST (Impacting functional limitations):  1. Decreased Strength  2. Decreased ADL/Functional Activities  3. Decreased Ambulation Ability/Technique  4. Decreased Balance  5. Increased Pain  6. Decreased Activity Tolerance  7. Decreased Flexibility/Joint Mobility  8. Decreased [place] with Home Exercise Program INTERVENTIONS PLANNED: (Treatment may consist of any combination of the following)  1. Balance Exercise  2. Gait Training  3. Home Exercise Program (HEP)  4. Manual Therapy  5. Neuromuscular Re-education/Strengthening  6. Range of Motion (ROM)  7.  Therapeutic Exercise/Strengthening     GOALS: (Goals have been discussed and agreed upon with patient.)  Discharge Goals: Time Frame: 60 days  1. Patient will be independent with home exercise program without assistance from therapist. MET  2. Patient will report KASSI score to 10/50 or less to demonstrate improved functional capacity. NOT MET  3. Patient will demonstrate ability to stand on right LE without pain to resume normal ADL function. NOT MET  4. Patient will perform walking and stairs without dfficulty to demonstrate improved functional mobility. NOT MET  5. Patient will perform 30 sit to stands without complaints of pain to resume normal recreational tasks with less pain/difficulty. NOT MET      OUTCOME MEASURE:   Tool Used: Modified Oswestry Low Back Pain Questionnaire  Score:  Initial: 26/50 (9/17/19)  Most Recent: X/50 (Date: -- )   Interpretation of Score: Each section is scored on a 0-5 scale, 5 representing the greatest disability. The scores of each section are added together for a total score of 50. Rehabilitation Potential For Stated Goals: Dorian Klein has been seen in physical therapy from 9/17/19 to 9/24/19 for 3 visits. Treatment has been discontinued at this time due to patient failing to return for additional treatment. The below goals were met prior to discontinuation. Some goals were not met due to self discharge.    Thank you for this referral.

## 2020-01-06 ENCOUNTER — HOSPITAL ENCOUNTER (OUTPATIENT)
Dept: ULTRASOUND IMAGING | Age: 70
Discharge: HOME | End: 2020-01-06
Attending: INTERNAL MEDICINE

## 2020-01-06 DIAGNOSIS — C73 PAPILLARY THYROID CARCINOMA (HCC): ICD-10-CM

## 2020-07-24 ENCOUNTER — HOSPITAL ENCOUNTER (OUTPATIENT)
Dept: MAMMOGRAPHY | Age: 70
Discharge: HOME | End: 2020-07-24
Attending: FAMILY MEDICINE
Payer: MEDICARE

## 2020-07-24 DIAGNOSIS — Z12.31 VISIT FOR SCREENING MAMMOGRAM: ICD-10-CM

## 2020-07-24 PROCEDURE — 77063 BREAST TOMOSYNTHESIS BI: CPT

## 2020-08-31 ENCOUNTER — HOSPITAL ENCOUNTER (OUTPATIENT)
Dept: LAB | Age: 70
Discharge: HOME | End: 2020-08-31
Payer: MEDICARE

## 2020-08-31 DIAGNOSIS — Z98.84 STATUS POST BARIATRIC SURGERY: ICD-10-CM

## 2020-08-31 DIAGNOSIS — E89.0 POSTSURGICAL HYPOTHYROIDISM: ICD-10-CM

## 2020-08-31 DIAGNOSIS — C73 PAPILLARY THYROID CARCINOMA (HCC): ICD-10-CM

## 2020-08-31 DIAGNOSIS — Z13.21 ENCOUNTER FOR VITAMIN DEFICIENCY SCREENING: ICD-10-CM

## 2020-08-31 LAB
ALBUMIN SERPL-MCNC: 3.9 G/DL (ref 3.2–4.6)
ALBUMIN SERPL-MCNC: 3.9 G/DL (ref 3.2–4.6)
ALBUMIN/GLOB SERPL: 1.3 {RATIO} (ref 1.2–3.5)
ALP SERPL-CCNC: 40 U/L (ref 50–136)
ALT SERPL-CCNC: 31 U/L (ref 12–65)
ANION GAP SERPL CALC-SCNC: 9 MMOL/L (ref 7–16)
AST SERPL-CCNC: 27 U/L (ref 15–37)
BILIRUB DIRECT SERPL-MCNC: 0.1 MG/DL
BILIRUB SERPL-MCNC: 0.5 MG/DL (ref 0.2–1.1)
BUN SERPL-MCNC: 23 MG/DL (ref 8–23)
CALCIUM SERPL-MCNC: 9.6 MG/DL (ref 8.3–10.4)
CALCIUM SERPL-MCNC: 9.8 MG/DL (ref 8.3–10.4)
CALCIUM SERPL-MCNC: 9.9 MG/DL (ref 8.3–10.4)
CHLORIDE SERPL-SCNC: 102 MMOL/L (ref 98–107)
CO2 SERPL-SCNC: 26 MMOL/L (ref 21–32)
CREAT SERPL-MCNC: 0.81 MG/DL (ref 0.6–1)
GLOBULIN SER CALC-MCNC: 3.1 G/DL (ref 2.3–3.5)
GLUCOSE SERPL-MCNC: 86 MG/DL (ref 65–100)
MAGNESIUM SERPL-MCNC: 2.3 MG/DL (ref 1.8–2.4)
PHOSPHATE SERPL-MCNC: 3.4 MG/DL (ref 2.3–3.7)
PHOSPHATE SERPL-MCNC: 3.4 MG/DL (ref 2.3–3.7)
POTASSIUM SERPL-SCNC: 3.5 MMOL/L (ref 3.5–5.1)
PROT SERPL-MCNC: 7 G/DL (ref 6.3–8.2)
PTH-INTACT SERPL-MCNC: 43.7 PG/ML (ref 18.5–88)
SODIUM SERPL-SCNC: 137 MMOL/L (ref 136–145)

## 2020-08-31 PROCEDURE — 84630 ASSAY OF ZINC: CPT

## 2020-08-31 PROCEDURE — 83970 ASSAY OF PARATHORMONE: CPT

## 2020-08-31 PROCEDURE — 83735 ASSAY OF MAGNESIUM: CPT

## 2020-08-31 PROCEDURE — 80069 RENAL FUNCTION PANEL: CPT

## 2020-08-31 PROCEDURE — 86800 THYROGLOBULIN ANTIBODY: CPT

## 2020-08-31 PROCEDURE — 84100 ASSAY OF PHOSPHORUS: CPT

## 2020-08-31 PROCEDURE — 36415 COLL VENOUS BLD VENIPUNCTURE: CPT

## 2020-08-31 PROCEDURE — 80076 HEPATIC FUNCTION PANEL: CPT

## 2020-08-31 PROCEDURE — 82310 ASSAY OF CALCIUM: CPT

## 2020-08-31 PROCEDURE — 84432 ASSAY OF THYROGLOBULIN: CPT

## 2020-08-31 PROCEDURE — 84425 ASSAY OF VITAMIN B-1: CPT

## 2020-09-01 LAB
THYROGLOB AB SERPL-ACNC: <1 IU/ML (ref 0–0.9)
THYROGLOBULIN, SERUM, 006694: 0.5 NG/ML (ref 1.5–38.5)

## 2020-09-02 LAB
VIT B1 BLD-SCNC: 113.2 NMOL/L (ref 66.5–200)
ZINC SERPL-MCNC: 81 UG/DL (ref 56–134)

## 2021-07-13 ENCOUNTER — TRANSCRIBE ORDER (OUTPATIENT)
Dept: SCHEDULING | Age: 71
End: 2021-07-13

## 2021-07-13 DIAGNOSIS — Z12.31 VISIT FOR SCREENING MAMMOGRAM: Primary | ICD-10-CM

## 2021-07-27 ENCOUNTER — HOSPITAL ENCOUNTER (OUTPATIENT)
Dept: MAMMOGRAPHY | Age: 71
Discharge: HOME | End: 2021-07-27
Attending: FAMILY MEDICINE
Payer: MEDICARE

## 2021-07-27 DIAGNOSIS — Z12.31 VISIT FOR SCREENING MAMMOGRAM: ICD-10-CM

## 2021-07-27 PROCEDURE — 77063 BREAST TOMOSYNTHESIS BI: CPT

## 2021-10-12 PROBLEM — R07.2 PRECORDIAL PAIN: Status: ACTIVE | Noted: 2021-10-12

## 2021-10-26 NOTE — PROGRESS NOTES
Patient pre-assessment complete for [facility] scheduled for Jane Klein, arrival time 0600. Patient verified using . Patient instructed to bring all home medications in labeled bottles on the day of procedure. NPO status reinforced. Patient informed to take a full dose aspirin 325mg  or 81 mg x 4 on the day of procedure. Patient instructed to HOLD all vitamins and Hydrochlorothiazide. Instructed they can take all other medications excluding vitamins & supplements. Patient verbalizes understanding of all instructions & denies any questions at this time.

## 2021-10-27 ENCOUNTER — HOSPITAL ENCOUNTER (OUTPATIENT)
Age: 71
Setting detail: OUTPATIENT SURGERY
Discharge: HOME | End: 2021-10-27
Attending: INTERNAL MEDICINE | Admitting: INTERNAL MEDICINE
Payer: MEDICARE

## 2021-10-27 VITALS
DIASTOLIC BLOOD PRESSURE: 64 MMHG | OXYGEN SATURATION: 97 % | HEART RATE: 60 BPM | SYSTOLIC BLOOD PRESSURE: 119 MMHG | HEIGHT: 65 IN | BODY MASS INDEX: 26.66 KG/M2 | WEIGHT: 160 LBS

## 2021-10-27 DIAGNOSIS — R07.2 PRECORDIAL PAIN: ICD-10-CM

## 2021-10-27 LAB
ANION GAP SERPL CALC-SCNC: 2 MMOL/L (ref 7–16)
ATRIAL RATE: 54 BPM
BUN SERPL-MCNC: 24 MG/DL (ref 8–23)
CALCIUM SERPL-MCNC: 9.8 MG/DL (ref 8.3–10.4)
CALCULATED P AXIS, ECG09: 36 DEGREES
CALCULATED R AXIS, ECG10: 20 DEGREES
CALCULATED T AXIS, ECG11: 20 DEGREES
CHLORIDE SERPL-SCNC: 107 MMOL/L (ref 98–107)
CO2 SERPL-SCNC: 32 MMOL/L (ref 21–32)
CREAT SERPL-MCNC: 0.83 MG/DL (ref 0.6–1)
DIAGNOSIS, 93000: NORMAL
ERYTHROCYTE [DISTWIDTH] IN BLOOD BY AUTOMATED COUNT: 12.6 % (ref 11.9–14.6)
GLUCOSE SERPL-MCNC: 94 MG/DL (ref 65–100)
HCT VFR BLD AUTO: 32.1 % (ref 35.8–46.3)
HGB BLD-MCNC: 10.4 G/DL (ref 11.7–15.4)
MAGNESIUM SERPL-MCNC: 2.3 MG/DL (ref 1.8–2.4)
MCH RBC QN AUTO: 30.4 PG (ref 26.1–32.9)
MCHC RBC AUTO-ENTMCNC: 32.4 G/DL (ref 31.4–35)
MCV RBC AUTO: 93.9 FL (ref 79.6–97.8)
NRBC # BLD: 0 K/UL (ref 0–0.2)
P-R INTERVAL, ECG05: 184 MS
PLATELET # BLD AUTO: 253 K/UL (ref 150–450)
PMV BLD AUTO: 9.7 FL (ref 9.4–12.3)
POTASSIUM SERPL-SCNC: 3.2 MMOL/L (ref 3.5–5.1)
Q-T INTERVAL, ECG07: 436 MS
QRS DURATION, ECG06: 98 MS
QTC CALCULATION (BEZET), ECG08: 413 MS
RBC # BLD AUTO: 3.42 M/UL (ref 4.05–5.2)
SODIUM SERPL-SCNC: 141 MMOL/L (ref 136–145)
VENTRICULAR RATE, ECG03: 54 BPM
WBC # BLD AUTO: 5.7 K/UL (ref 4.3–11.1)

## 2021-10-27 PROCEDURE — 85027 COMPLETE CBC AUTOMATED: CPT

## 2021-10-27 PROCEDURE — 99153 MOD SED SAME PHYS/QHP EA: CPT | Performed by: INTERNAL MEDICINE

## 2021-10-27 PROCEDURE — 74011000250 HC RX REV CODE- 250: Performed by: INTERNAL MEDICINE

## 2021-10-27 PROCEDURE — 83735 ASSAY OF MAGNESIUM: CPT

## 2021-10-27 PROCEDURE — 74011250636 HC RX REV CODE- 250/636: Performed by: INTERNAL MEDICINE

## 2021-10-27 PROCEDURE — C1751 CATH, INF, PER/CENT/MIDLINE: HCPCS | Performed by: INTERNAL MEDICINE

## 2021-10-27 PROCEDURE — C1769 GUIDE WIRE: HCPCS | Performed by: INTERNAL MEDICINE

## 2021-10-27 PROCEDURE — 93460 R&L HRT ART/VENTRICLE ANGIO: CPT | Performed by: INTERNAL MEDICINE

## 2021-10-27 PROCEDURE — 99152 MOD SED SAME PHYS/QHP 5/>YRS: CPT | Performed by: INTERNAL MEDICINE

## 2021-10-27 PROCEDURE — 74011000636 HC RX REV CODE- 636: Performed by: INTERNAL MEDICINE

## 2021-10-27 PROCEDURE — C1894 INTRO/SHEATH, NON-LASER: HCPCS | Performed by: INTERNAL MEDICINE

## 2021-10-27 PROCEDURE — 77030042317 HC BND COMPR HEMSTAT -B: Performed by: INTERNAL MEDICINE

## 2021-10-27 PROCEDURE — 74011250637 HC RX REV CODE- 250/637: Performed by: INTERNAL MEDICINE

## 2021-10-27 PROCEDURE — 93005 ELECTROCARDIOGRAM TRACING: CPT | Performed by: INTERNAL MEDICINE

## 2021-10-27 PROCEDURE — 80048 BASIC METABOLIC PNL TOTAL CA: CPT

## 2021-10-27 PROCEDURE — 77030016699 HC CATH ANGI DX INFN1 CARD -A: Performed by: INTERNAL MEDICINE

## 2021-10-27 RX ORDER — ACETAMINOPHEN 325 MG/1
325-650 TABLET ORAL
Status: DISCONTINUED | OUTPATIENT
Start: 2021-10-27 | End: 2021-10-27 | Stop reason: HOSPADM

## 2021-10-27 RX ORDER — LIDOCAINE HYDROCHLORIDE 10 MG/ML
INJECTION INFILTRATION; PERINEURAL AS NEEDED
Status: DISCONTINUED | OUTPATIENT
Start: 2021-10-27 | End: 2021-10-27 | Stop reason: HOSPADM

## 2021-10-27 RX ORDER — SODIUM CHLORIDE 9 MG/ML
75 INJECTION, SOLUTION INTRAVENOUS CONTINUOUS
Status: DISCONTINUED | OUTPATIENT
Start: 2021-10-27 | End: 2021-10-27 | Stop reason: HOSPADM

## 2021-10-27 RX ORDER — GUAIFENESIN 100 MG/5ML
324 LIQUID (ML) ORAL ONCE
Status: DISCONTINUED | OUTPATIENT
Start: 2021-10-27 | End: 2021-10-27 | Stop reason: HOSPADM

## 2021-10-27 RX ORDER — MIDAZOLAM HYDROCHLORIDE 1 MG/ML
INJECTION, SOLUTION INTRAMUSCULAR; INTRAVENOUS AS NEEDED
Status: DISCONTINUED | OUTPATIENT
Start: 2021-10-27 | End: 2021-10-27 | Stop reason: HOSPADM

## 2021-10-27 RX ORDER — ONDANSETRON 2 MG/ML
INJECTION INTRAMUSCULAR; INTRAVENOUS AS NEEDED
Status: DISCONTINUED | OUTPATIENT
Start: 2021-10-27 | End: 2021-10-27 | Stop reason: HOSPADM

## 2021-10-27 RX ORDER — HEPARIN SODIUM 200 [USP'U]/100ML
INJECTION, SOLUTION INTRAVENOUS
Status: COMPLETED | OUTPATIENT
Start: 2021-10-27 | End: 2021-10-27

## 2021-10-27 RX ADMIN — ACETAMINOPHEN 650 MG: 325 TABLET ORAL at 09:59

## 2021-10-27 NOTE — PROGRESS NOTES
Discharge instructions given per orders, voiced good understanding of right arm care, medications & follow up care.  Denies any questions

## 2021-10-27 NOTE — PROGRESS NOTES
TRANSFER - OUT REPORT:    Verbal report given to RN(name) on Halle Moser  being transferred to CPRU(unit) for routine progression of care       Report consisted of patients Situation, Background, Assessment and   Recommendations(SBAR). Information from the following report(s) SBAR was reviewed with the receiving nurse.     RHC/LHC with Dr Abebe Mix  No interventions  R cephalic with 7fr sheath left in place, covered with tegaderm  R Radial with TR band at 12mL  Versed 2mg  Zofran 4mg  Heparin 5000 units in radial cocktail

## 2021-10-27 NOTE — PROGRESS NOTES
Patient received to [address] [place] [address] room # 12  Ambulatory from [facility]. Patient scheduled for [facility] today with Dr Regi Lewis. Procedure reviewed & questions answered, voiced good understanding consent obtained & placed on chart. All medications and medical history reviewed. Will prep patient per orders. Patient & family updated on plan of care. The patient has a fraility score of 3-MANAGING WELL, based on ability to perform ADLS by self.

## 2021-10-27 NOTE — PROGRESS NOTES
TRANSFER - IN REPORT:    Verbal report received from [facility] RN(name) on Claudette Miles  being received from cath lab(unit) for routine progression of care      Report consisted of patients Situation, Background, Assessment and   Recommendations(SBAR). Information from the following report(s) Procedure Summary was reviewed with the receiving nurse. Opportunity for questions and clarification was provided. Assessment completed upon patients arrival to unit and care assumed.

## 2021-10-27 NOTE — PROGRESS NOTES
7 FR venous sheath removed from right brachial using sterile technique. Manual pressure held over site for 10minutes. Hemostasis achieved at 1000. Right brachial without bleeding without hematoma. Sterile gauze placed over site and secured with tegaderm. Arm board applied to right arm. Patient instructed to keep right arm straight and still and head on pillow. Patient verbalizes understanding.

## 2021-10-27 NOTE — DISCHARGE INSTRUCTIONS
HEART CATHETERIZATION/ANGIOGRAPHY DISCHARGE INSTRUCTIONS    1. Check puncture site frequently for swelling or bleeding. If there is any bleeding, apply pressure over the area with a clean towel or washcloth and call 911. Notify your doctor for any redness, swelling, drainage, or oozing from the puncture site. Notify your doctor for any fever or chills. 2. If the extremity becomes cold, numb, or painful call [facility] at [phone].  3. Activity should be limited for the next 48 hours. No heavy lifting, pushing, pulling  or strenuous activity for 48 hours. No heavy lifting (anything over 10 pounds) for 3 days. 4. You may resume your usual diet. Drink more fluids than usual.  5. Have a responsible person drive you home and stay with you for at least 24 hours after your heart catheterization/angiography. 6. You may remove bandage from your Right wrist and right brachial in 24 hours. You may shower in 24 hours. No tub baths, hot tubs, or swimming for 1 week. Do not place any lotions, creams, powders, or ointments over puncture site for 1 week. You may place a clean band-aid over the puncture site each day for 5 days. Change daily. Sedation for a Medical Procedure: Care Instructions     You were given a sedative medication during your visit. While many of the effects will have worn   off before you leave; you may continue to feel some effects for several hours. Common side effects from sedation include:  · Feeling sleepy. (Your doctors and nurses will make sure you are not too sleepy to go home.)  · Nausea and vomiting. This usually does not last long. · Feeling tired. How can you care for yourself at home? Activity    · Don't do anything for 24 hours that requires attention to detail. It takes time for the medicine effects to completely wear off. · Do not make important legal decisions for 24 hours. · Do not sign any legal documents for 24 hours.      · Do not drink alcohol today · For your safety, you should not drive or operate heavy machinery for the remainder of the day     · Rest when you feel tired. Getting enough sleep will help you recover. Diet    · You can eat your normal diet, unless your doctor gives you other instructions. If your stomach is upset, try clear liquids and bland, low-fat foods like plain toast or rice. · Drink plenty of fluids (unless your doctor tells you not to). · Don't drink alcohol for 24 hours. Medicines    · Be safe with medicines. Read and follow all instructions on the label. · If the doctor gave you a prescription medicine for pain, take it as prescribed. · If you are not taking a prescription pain medicine, ask your doctor if you can take an over-the-counter medicine. · If you think your pain medicine is making you sick to your stomach:  · Take your medicine after meals (unless your doctor has told you not to). · Ask your doctor for a different pain medicine. I have read the above instructions and have had the opportunity to ask questions.       Patient: ________________________   Date: _____________    Witness: _______________________   Date: _____________

## 2021-10-27 NOTE — PROCEDURES
Right and left heart catheterization was performed without difficulty from the right upper extremity. See the [facility] report for details.

## 2021-12-02 ENCOUNTER — HOSPITAL ENCOUNTER (OUTPATIENT)
Dept: LAB | Age: 71
Discharge: HOME | End: 2021-12-02

## 2021-12-02 PROCEDURE — 88312 SPECIAL STAINS GROUP 1: CPT

## 2021-12-02 PROCEDURE — 88305 TISSUE EXAM BY PATHOLOGIST: CPT

## 2022-02-22 PROBLEM — C73 THYROID CANCER (HCC): Status: RESOLVED | Noted: 2019-09-05 | Resolved: 2022-02-22

## 2022-03-18 PROBLEM — R30.0 DYSURIA: Status: ACTIVE | Noted: 2019-04-29

## 2022-03-18 PROBLEM — Z86.39 HISTORY OF HYPERGLYCEMIA: Status: ACTIVE | Noted: 2019-09-05

## 2022-03-19 PROBLEM — R68.84 JAW PAIN: Status: ACTIVE | Noted: 2019-04-29

## 2022-03-19 PROBLEM — K21.9 GERD (GASTROESOPHAGEAL REFLUX DISEASE): Status: ACTIVE | Noted: 2019-09-05

## 2022-03-19 PROBLEM — M25.552 PAIN OF BOTH HIP JOINTS: Status: ACTIVE | Noted: 2018-10-31

## 2022-03-19 PROBLEM — M25.551 PAIN OF BOTH HIP JOINTS: Status: ACTIVE | Noted: 2018-10-31

## 2022-03-19 PROBLEM — H60.11: Status: ACTIVE | Noted: 2019-07-16

## 2022-03-19 PROBLEM — G47.30 SLEEP APNEA: Status: ACTIVE | Noted: 2019-09-05

## 2022-03-19 PROBLEM — J45.20 MILD INTERMITTENT ASTHMA: Status: ACTIVE | Noted: 2019-09-05

## 2022-03-19 PROBLEM — R13.10 ODYNOPHAGIA: Status: ACTIVE | Noted: 2017-06-23

## 2022-03-19 PROBLEM — Z98.890 S/P THYROID SURGERY: Status: ACTIVE | Noted: 2018-02-15

## 2022-03-19 PROBLEM — M48.00 SPINAL STENOSIS: Status: ACTIVE | Noted: 2019-05-16

## 2022-03-19 PROBLEM — J30.9 ALLERGIC RHINITIS: Status: ACTIVE | Noted: 2019-09-05

## 2022-03-19 PROBLEM — E89.0 POSTSURGICAL HYPOTHYROIDISM: Status: ACTIVE | Noted: 2018-04-20

## 2022-03-19 PROBLEM — R07.2 PRECORDIAL PAIN: Status: ACTIVE | Noted: 2021-10-12

## 2022-03-20 PROBLEM — C73 PAPILLARY THYROID CARCINOMA (HCC): Status: ACTIVE | Noted: 2018-01-24

## 2022-03-20 PROBLEM — M48.061 SPINAL STENOSIS OF LUMBAR REGION AT MULTIPLE LEVELS: Status: ACTIVE | Noted: 2019-05-16

## 2022-03-20 PROBLEM — Z87.790 HISTORY OF BRANCHIAL CLEFT CYST: Status: ACTIVE | Noted: 2017-06-30

## 2022-05-27 DIAGNOSIS — E89.0 POSTSURGICAL HYPOTHYROIDISM: Primary | ICD-10-CM

## 2022-05-27 DIAGNOSIS — C73 PAPILLARY THYROID CARCINOMA (HCC): ICD-10-CM

## 2022-08-04 ENCOUNTER — HOSPITAL ENCOUNTER (OUTPATIENT)
Dept: MAMMOGRAPHY | Age: 72
Discharge: HOME | End: 2022-08-07
Payer: MEDICARE

## 2022-08-04 DIAGNOSIS — Z12.31 ENCOUNTER FOR SCREENING MAMMOGRAM FOR MALIGNANT NEOPLASM OF BREAST: ICD-10-CM

## 2022-08-04 PROCEDURE — 77063 BREAST TOMOSYNTHESIS BI: CPT

## 2023-01-26 NOTE — PERIOP NOTES
TRANSFER - OUT REPORT: 
 
Verbal report given to Juana Weber RN(name) on Alexandro Dupree  being transferred to 729(unit) for routine post - op Report consisted of patients Situation, Background, Assessment and  
Recommendations(SBAR). Information from the following report(s) SBAR, OR Summary, Procedure Summary, Intake/Output and MAR was reviewed with the receiving nurse. Lines:  
Peripheral IV 05/16/19 Right Hand (Active) Site Assessment Clean, dry, & intact 5/16/2019  3:43 PM  
Phlebitis Assessment 0 5/16/2019  3:43 PM  
Infiltration Assessment 0 5/16/2019  3:43 PM  
Dressing Status Clean, dry, & intact 5/16/2019  3:43 PM  
Dressing Type Transparent 5/16/2019  3:43 PM  
Hub Color/Line Status Pink; Infusing 5/16/2019  3:43 PM  
  
 
Opportunity for questions and clarification was provided. Patient transported with: 
 O2 @ 2 liters VTE prophylaxis orders have been written for Alexandro Dupree. Patient and family given floor number and nurses name. Family updated re: pt status after security code verified. parotidectomy with facial nerve monitoring

## 2023-02-16 DIAGNOSIS — C73 PAPILLARY THYROID CARCINOMA (HCC): ICD-10-CM

## 2023-02-16 DIAGNOSIS — E89.0 POSTSURGICAL HYPOTHYROIDISM: ICD-10-CM

## 2023-02-16 LAB — TSH W FREE THYROID IF ABNORMAL: 0.96 UIU/ML (ref 0.36–3.74)

## 2023-02-22 ENCOUNTER — OFFICE VISIT (OUTPATIENT)
Dept: ENDOCRINOLOGY | Age: 73
End: 2023-02-22
Payer: MEDICARE

## 2023-02-22 VITALS
BODY MASS INDEX: 27.79 KG/M2 | DIASTOLIC BLOOD PRESSURE: 86 MMHG | HEART RATE: 71 BPM | SYSTOLIC BLOOD PRESSURE: 138 MMHG | OXYGEN SATURATION: 96 % | WEIGHT: 167 LBS

## 2023-02-22 DIAGNOSIS — E89.0 POSTSURGICAL HYPOTHYROIDISM: ICD-10-CM

## 2023-02-22 DIAGNOSIS — C73 PAPILLARY THYROID CARCINOMA (HCC): Primary | ICD-10-CM

## 2023-02-22 LAB
THYROGLOB AB SERPL-ACNC: <1 IU/ML (ref 0–0.9)
THYROGLOBULIN: 0.4 NG/ML (ref 1.5–38.5)

## 2023-02-22 PROCEDURE — G8427 DOCREV CUR MEDS BY ELIG CLIN: HCPCS | Performed by: INTERNAL MEDICINE

## 2023-02-22 PROCEDURE — 3079F DIAST BP 80-89 MM HG: CPT | Performed by: INTERNAL MEDICINE

## 2023-02-22 PROCEDURE — 3075F SYST BP GE 130 - 139MM HG: CPT | Performed by: INTERNAL MEDICINE

## 2023-02-22 PROCEDURE — 1090F PRES/ABSN URINE INCON ASSESS: CPT | Performed by: INTERNAL MEDICINE

## 2023-02-22 PROCEDURE — 4004F PT TOBACCO SCREEN RCVD TLK: CPT | Performed by: INTERNAL MEDICINE

## 2023-02-22 PROCEDURE — 99214 OFFICE O/P EST MOD 30 MIN: CPT | Performed by: INTERNAL MEDICINE

## 2023-02-22 PROCEDURE — 3017F COLORECTAL CA SCREEN DOC REV: CPT | Performed by: INTERNAL MEDICINE

## 2023-02-22 PROCEDURE — G8400 PT W/DXA NO RESULTS DOC: HCPCS | Performed by: INTERNAL MEDICINE

## 2023-02-22 PROCEDURE — G8484 FLU IMMUNIZE NO ADMIN: HCPCS | Performed by: INTERNAL MEDICINE

## 2023-02-22 PROCEDURE — G8419 CALC BMI OUT NRM PARAM NOF/U: HCPCS | Performed by: INTERNAL MEDICINE

## 2023-02-22 PROCEDURE — 1123F ACP DISCUSS/DSCN MKR DOCD: CPT | Performed by: INTERNAL MEDICINE

## 2023-02-22 RX ORDER — LEVOTHYROXINE SODIUM 0.1 MG/1
100 TABLET ORAL
Qty: 90 TABLET | Refills: 3 | Status: SHIPPED | OUTPATIENT
Start: 2023-02-22

## 2023-02-22 RX ORDER — PRAVASTATIN SODIUM 40 MG
40 TABLET ORAL DAILY
COMMUNITY
Start: 2023-01-04 | End: 2024-01-04

## 2023-02-22 ASSESSMENT — ENCOUNTER SYMPTOMS
CONSTIPATION: 0
DIARRHEA: 0
ROS SKIN COMMENTS: DENIES ABNORMAL HAIR LOSS, DRY SKIN.

## 2023-02-22 NOTE — PROGRESS NOTES
Carlos Álvarez MD, [facility]  [address], [address] [facility], [address] [place] [address]  Phone [phone]  Facsimile [phone]          Leslie Foster is a 67 y.o. female seen 2/22/2023 for follow-up of thyroid cancer and hypothyroidism           ASSESSMENT AND PLAN:     1. 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 Pathology revealed the presence of microscopic invasion of perithyroidal  adipose tissue only with no change in tumor stage, however. She therefore has stage I disease. This is an BHARAT low risk tumor and radioactive iodine remnant ablation was therefore not indicated. We are likely to detect any clinically significant disease persistence or recurrence through periodic monitoring of thyroglobulin levels and neck ultrasounds. Neck ultrasound negative 2/2022. Her thyroglobulin level has been very low as expected and seems to correlate with her serum TSH, most recently 2/2023. At this point she appears to be disease-free. Follow-up in 1 year with a thyroglobulin level prior to visit. 2. Postsurgical hypothyroidism  Goal TSH 0.4-2.0 (although 0.1-0.4 is acceptable in the absence of thyrotoxic symptoms). - levothyroxine (SYNTHROID) 100 mcg tablet; Take 1 Tab by mouth Daily (before breakfast). Dispense: 90 Tab; Refill: 3      Follow-up and Dispositions    Return in about 1 year (around 2/22/2024).          HISTORY OF PRESENT ILLNESS:    THYROID CANCER     Presentation: Right thyroid nodule status post FNA biopsy revealing PTC, status post total thyroidectomy and right central lymphadenectomy 2/15/2018 (pathology revealed a right lobe follicular variant PTC measuring 1.6 cm, negative margins, no angioinvasion, no lymphatic invasion, +microscopic invasion of perithyroidal adipose tissue only with no change in tumor stage; 8 out of 8 lymph nodes negative; pT1b N0). Surgical complications: None. Current symptoms: Denies neck lumps, lymphadenopathy. Imaging:  Neck ultrasound 1/26/2018: Right lobe 1.47 x 1.45 x 4.16 cm, heterogeneous echotexture. In the superior right lobe laterally there is a mildly hypoechoic and heterogeneous nodule measuring 0.55 x 0.57 x 0.76 cm. The nodule is somewhat ill-defined but contains no microcalcifications. In the mid right lobe there is an ill-defined hypoechoic nodule measuring 0.88 x 0.87 x 0.96 cm. It appears to contain some microcalcifications with posterior acoustic shadowing. In the inferior right lobe there is a solid, fairly isoechoic and heterogeneous nodule measuring 0.55 x 0.58 x 0.60 cm containing no microcalcifications. Isthmus measures 0.63 cm. Left lobe 1.53 x 1.16 x 3.56 cm, markedly heterogeneous echotexture, no obvious nodules. Examination of the central and lateral cervical compartments reveals no abnormal lymph nodes bilaterally. There is a right level 3 lymph node measuring 0.34 x 0.73 x 0.97 cm containing a fatty hilum. There is a left level 2 lymph node measuring 0.37 x 0.46 x 0.71 cm containing a fatty hilum. Neck ultrasound 1/9/2019: No residual thyroid tissue in the surgical bed. No evidence of significant cervical lymphadenopathy. Neck ultrasound 1/6/2020: Unremarkable thyroid bed. No abnormal lymph nodes. Neck ultrasound 2/22/2022: There are no masses in the thyroid bed. Examination of the central and lateral cervical compartments reveals no abnormal lymph nodes bilaterally.      Labs:  1/10/2018: TSH 2.170, free T4 0.8.  4/16/2018: TSH 0.029, free T4 1.86, Tg 0.5, Tg Ab <1.0.  8/20/2018: TSH 0.048, free T4 1.64, Tg 0.4, Tg Ab <1.0.  1/2/2019: TSH 0.422, free T4 1.38, Tg 0.4, Tg Ab <1.0.  7/15/2019: TSH 0.169, free T4 1.56, Tg 0.3, Tg Ab <1.0.  1/13/2020: TSH 0.650, Tg 0.4, Tg Ab <1.0.  8/31/2020: Tg 0.5, Tg Ab <1.0.  2/15/2021: TSH 1.390, Tg 0.5, Tg Ab <1.0.  2/14/2022: TSH 0.925, Tg 0.4, Tg Ab <1.0.  2/16/2023: TSH 0.96, Tg 0.4, Tg Ab <1.0.        THYROID DISEASE     Presentation/Diagnosis: Postsurgical hypothyroidism. Symptoms: See review of systems. Treatment: Takes generic in AM correctly. Review of Systems   Constitutional:  Negative for diaphoresis and fatigue. Weight increased 3 pounds since last visit. Cardiovascular:  Negative for palpitations. Gastrointestinal:  Negative for constipation and diarrhea. Endocrine: Negative for cold intolerance and heat intolerance. Skin:         Denies abnormal hair loss, dry skin. Neurological:  Negative for tremors. Vital Signs:  /86   Pulse 71   Wt 167 lb (75.8 kg)   SpO2 96%   BMI 27.79 kg/m²     Wt Readings from Last 3 Encounters:   02/22/23 167 lb (75.8 kg)   02/22/22 164 lb 11.2 oz (74.7 kg)   10/12/21 163 lb 1.6 oz (74 kg)       Physical Exam  Constitutional:       General: She is not in acute distress. Neck:      Comments: Thyroidectomy scar. Cardiovascular:      Rate and Rhythm: Normal rate and regular rhythm. Lymphadenopathy:      Cervical: No cervical adenopathy. Neurological:      Motor: No tremor. No orders of the defined types were placed in this encounter.       Current Outpatient Medications   Medication Sig Dispense Refill    pravastatin (PRAVACHOL) 40 MG tablet Take 40 mg by mouth daily      acetaminophen (TYLENOL) 325 MG tablet Take 650 mg by mouth every 8 hours      Biotin 2.5 MG TABS Take 1 tablet by mouth 2 times daily      hydroCHLOROthiazide (HYDRODIURIL) 25 MG tablet Take 25 mg by mouth daily      levothyroxine (SYNTHROID) 100 MCG tablet Take 100 mcg by mouth every morning (before breakfast)      montelukast (SINGULAIR) 10 MG tablet Take 10 mg by mouth daily      nitroGLYCERIN (NITROSTAT) 0.4 MG SL tablet Place 0.4 mg under the tongue as needed      omeprazole (PRILOSEC) 10 MG delayed release capsule Take 10 mg by mouth daily PARoxetine (PAXIL) 10 MG tablet Take 10 mg by mouth every morning      zoster recombinant adjuvanted vaccine [facility]) 50 MCG/0.5ML SUSR injection 0.5mL by IntraMUSCular route once now and then repeat in 2-6 months       No current facility-administered medications for this visit.

## 2023-08-03 ENCOUNTER — TRANSCRIBE ORDERS (OUTPATIENT)
Dept: SCHEDULING | Age: 73
End: 2023-08-03

## 2023-08-03 DIAGNOSIS — Z12.31 ENCOUNTER FOR SCREENING MAMMOGRAM FOR MALIGNANT NEOPLASM OF BREAST: Primary | ICD-10-CM

## 2023-08-23 ENCOUNTER — HOSPITAL ENCOUNTER (OUTPATIENT)
Dept: MAMMOGRAPHY | Age: 73
Discharge: HOME | End: 2023-08-26
Payer: MEDICARE

## 2023-08-23 VITALS — WEIGHT: 170 LBS | BODY MASS INDEX: 28.32 KG/M2 | HEIGHT: 65 IN

## 2023-08-23 DIAGNOSIS — N64.4 MASTODYNIA: ICD-10-CM

## 2023-08-23 DIAGNOSIS — N64.4 MASTALGIA: ICD-10-CM

## 2023-08-23 PROCEDURE — 77066 DX MAMMO INCL CAD BI: CPT

## 2023-08-23 PROCEDURE — 76642 ULTRASOUND BREAST LIMITED: CPT

## 2024-03-18 DIAGNOSIS — C73 PAPILLARY THYROID CARCINOMA (HCC): ICD-10-CM

## 2024-03-18 DIAGNOSIS — E89.0 POSTSURGICAL HYPOTHYROIDISM: ICD-10-CM

## 2024-03-18 LAB — TSH W FREE THYROID IF ABNORMAL: 1 UIU/ML (ref 0.36–3.74)

## 2024-03-20 LAB
THYROGLOB AB SERPL-ACNC: <1 IU/ML (ref 0–0.9)
THYROGLOBULIN: 0.4 NG/ML (ref 1.5–38.5)

## 2024-05-03 ENCOUNTER — OFFICE VISIT (OUTPATIENT)
Dept: ENDOCRINOLOGY | Age: 74
End: 2024-05-03

## 2024-05-03 VITALS
SYSTOLIC BLOOD PRESSURE: 118 MMHG | HEART RATE: 72 BPM | BODY MASS INDEX: 29.79 KG/M2 | WEIGHT: 179 LBS | DIASTOLIC BLOOD PRESSURE: 72 MMHG | OXYGEN SATURATION: 94 %

## 2024-05-03 DIAGNOSIS — Z85.850 HISTORY OF THYROID CANCER: Primary | ICD-10-CM

## 2024-05-03 DIAGNOSIS — E89.0 POSTSURGICAL HYPOTHYROIDISM: ICD-10-CM

## 2024-05-03 RX ORDER — LEVOTHYROXINE SODIUM 0.1 MG/1
100 TABLET ORAL
Qty: 90 TABLET | Refills: 3
Start: 2024-05-03

## 2024-05-03 ASSESSMENT — ENCOUNTER SYMPTOMS
CONSTIPATION: 0
DIARRHEA: 0
ROS SKIN COMMENTS: DENIES ABNORMAL HAIR LOSS, DRY SKIN.

## 2024-05-03 NOTE — PROGRESS NOTES
Carlos Su MD, [facility]  [address] [place] [address], [facility] [address]  [place] [address]  Phone [phone]  Facsimile [phone]          Sarah Ness is a 74 y.o. female seen 5/3/2024 for follow-up of thyroid cancer and hypothyroidism           ASSESSMENT AND PLAN:    1. 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  Pathology revealed the presence of microscopic invasion of perithyroidal  adipose tissue only with no change in tumor stage, however.  She therefore has stage I disease.  This is an BHARAT low risk tumor and radioactive iodine remnant ablation was therefore not indicated.  We are likely to detect any clinically significant disease persistence or recurrence through periodic monitoring of thyroglobulin levels and neck ultrasounds.  Neck ultrasound negative 2/2022.  Her thyroglobulin level has been very low as expected and seems to correlate with her serum TSH, most recently 3/2024.  At this point she appears to be disease-free.  Follow-up in 1 year with a thyroglobulin level prior to visit.     2. Postsurgical hypothyroidism  Goal TSH 0.4-2.0 (although 0.1-0.4 is acceptable in the absence of thyrotoxic symptoms).       - levothyroxine (SYNTHROID) 100 mcg tablet; Take 1 Tab by mouth Daily (before breakfast).  Dispense: 90 Tab; Refill: 3      Follow-up and Dispositions    Return in about 1 year (around 5/3/2025).         HISTORY OF PRESENT ILLNESS:    THYROID CANCER     Presentation: Right thyroid nodule status post FNA biopsy revealing PTC, status post total thyroidectomy and right central lymphadenectomy 2/15/2018 (pathology revealed a right lobe follicular variant PTC measuring 1.6 cm, negative margins, no angioinvasion, no lymphatic invasion, +microscopic invasion of perithyroidal adipose tissue only with no change in tumor stage; 8 out of 8

## 2024-08-02 ENCOUNTER — TRANSCRIBE ORDERS (OUTPATIENT)
Dept: SCHEDULING | Age: 74
End: 2024-08-02

## 2024-08-02 DIAGNOSIS — Z12.31 SCREENING MAMMOGRAM FOR HIGH-RISK PATIENT: Primary | ICD-10-CM

## 2024-09-11 ENCOUNTER — HOSPITAL ENCOUNTER (OUTPATIENT)
Dept: MAMMOGRAPHY | Age: 74
Discharge: HOME | End: 2024-09-14
Payer: MEDICARE

## 2024-09-11 DIAGNOSIS — Z12.31 SCREENING MAMMOGRAM FOR HIGH-RISK PATIENT: ICD-10-CM

## 2024-09-11 PROCEDURE — 77063 BREAST TOMOSYNTHESIS BI: CPT

## 2024-09-26 ENCOUNTER — TELEPHONE (OUTPATIENT)
Dept: SURGERY | Age: 74
End: 2024-09-26

## 2024-10-01 ENCOUNTER — OFFICE VISIT (OUTPATIENT)
Dept: SURGERY | Age: 74
End: 2024-10-01

## 2024-10-01 VITALS
SYSTOLIC BLOOD PRESSURE: 157 MMHG | BODY MASS INDEX: 30.73 KG/M2 | HEIGHT: 64 IN | HEART RATE: 67 BPM | WEIGHT: 180 LBS | DIASTOLIC BLOOD PRESSURE: 85 MMHG

## 2024-10-01 DIAGNOSIS — Z98.84 S/P LAPAROSCOPIC SLEEVE GASTRECTOMY: ICD-10-CM

## 2024-10-01 DIAGNOSIS — Z71.3 DIETARY COUNSELING: ICD-10-CM

## 2024-10-01 DIAGNOSIS — Z71.82 EXERCISE COUNSELING: ICD-10-CM

## 2024-10-01 DIAGNOSIS — E66.3 OVERWEIGHT (BMI 25.0-29.9): Primary | ICD-10-CM

## 2024-10-01 NOTE — PROGRESS NOTES
[facility] NUTRITION REASSESSMENT  Assessment:   10 years post-op VSG. Pt consuming ~50g protein/d and ~40oz fluid/d. Pt is eating at least 3x/d. Pt is drinking water, decaf tea, coffee, and diet pepsi. Pt is exercising via walking and some gym workouts. Pt is taking MVI and Ca supplements as recommended.  Pt reports some weight gain.      Diet Recall:   Breakfast: Vegetable omelet    Lunch: Eggplant salad; rice cracker and almond butter   Dinner: Chx and potato salad     Intervention:   Evaluated diet recall.  Discussed pts continued following of the Mediterranean diet   Encouraged continued and increased activity.      Encouraged pt to increase core exercises     Monitoring and Evaluation:  Monitor for continued safe, supervised weight loss for VSG.   F/U per MD Ana Smith MBA. RD, LD   
intact  Neck: Supple, trachea midline, no appreciable thyromegaly  Resp:  Breathing is  non-labored. Lungs clear to auscultation without wheezing or rhonchi   CV: RRR. No murmurs, rubs or gallops appreciated.  Abd: soft, not distended, active BS'S. No tenderness to palpation. Wounds are clean, dry and intact without signs of infection.     Discussed the patient's BMI with her.  The BMI follow up plan is as follows:    Plan:  She is doing well without any major concerns.  She will continue a protein first diet, all questions were answered.  She will continue / increase the current exercise routine.  Resume water aerobics and gym visits.  She will continue the appropriate vitamins and supplements.  Will ask her PCP about starting weight loss medications.  Return to the office in 3 months or sooner.          Carlos Decker MD  9/30/2024

## 2025-01-02 ENCOUNTER — OFFICE VISIT (OUTPATIENT)
Dept: SURGERY | Age: 75
End: 2025-01-02
Payer: MEDICARE

## 2025-01-02 VITALS
HEIGHT: 64 IN | SYSTOLIC BLOOD PRESSURE: 160 MMHG | WEIGHT: 188 LBS | BODY MASS INDEX: 32.1 KG/M2 | DIASTOLIC BLOOD PRESSURE: 95 MMHG | HEART RATE: 80 BPM

## 2025-01-02 DIAGNOSIS — Z98.84 S/P LAPAROSCOPIC SLEEVE GASTRECTOMY: ICD-10-CM

## 2025-01-02 DIAGNOSIS — E66.3 OVERWEIGHT (BMI 25.0-29.9): Primary | ICD-10-CM

## 2025-01-02 PROCEDURE — 3077F SYST BP >= 140 MM HG: CPT | Performed by: SURGERY

## 2025-01-02 PROCEDURE — 1036F TOBACCO NON-USER: CPT | Performed by: SURGERY

## 2025-01-02 PROCEDURE — 1090F PRES/ABSN URINE INCON ASSESS: CPT | Performed by: SURGERY

## 2025-01-02 PROCEDURE — 1123F ACP DISCUSS/DSCN MKR DOCD: CPT | Performed by: SURGERY

## 2025-01-02 PROCEDURE — G8417 CALC BMI ABV UP PARAM F/U: HCPCS | Performed by: SURGERY

## 2025-01-02 PROCEDURE — G8427 DOCREV CUR MEDS BY ELIG CLIN: HCPCS | Performed by: SURGERY

## 2025-01-02 PROCEDURE — 99213 OFFICE O/P EST LOW 20 MIN: CPT | Performed by: SURGERY

## 2025-01-02 PROCEDURE — 1159F MED LIST DOCD IN RCRD: CPT | Performed by: SURGERY

## 2025-01-02 PROCEDURE — 3017F COLORECTAL CA SCREEN DOC REV: CPT | Performed by: SURGERY

## 2025-01-02 PROCEDURE — 3080F DIAST BP >= 90 MM HG: CPT | Performed by: SURGERY

## 2025-01-02 PROCEDURE — G8399 PT W/DXA RESULTS DOCUMENT: HCPCS | Performed by: SURGERY

## 2025-01-02 RX ORDER — FUROSEMIDE 80 MG/1
80 TABLET ORAL 2 TIMES DAILY
COMMUNITY

## 2025-01-02 NOTE — PROGRESS NOTES
Patient Name: Sarah Ness    Date: 1/2/2025    Weight History Graph  The patient is frustrated. She is gaining weight. She is not able to exercise as she fell after Thanksgiving. Her PCP, Dr. Aaron, said she can start weight loss medications.     10 yr post-op visit after a VSG was done on 1/14/15 .  Pre-op weight was 230 lbs with a present weight of 1 lbs.  The patient has lost 42 lbs since beginning the weight loss process.      The patient reports:    Unable to exercise due to her recent fall with knee and shoulder pain.   Protein per day: 70  Fluid intake:  50  Exercise: None  Denies abdominal pain, vomiting, or nausea.   Incisions healing well.  Denies reflux.   Tolerating Protein first diet without difficulty.    Physical Exam:  General: Alert, oriented, cooperative white female in no acute distress.   Eyes: Sclera are clear. Conjunctiva and lids within normal limits. No icterus.  Ears and Nose: no gross deformities to visual inspection, gross hearing intact  Neck: Supple, trachea midline, no appreciable thyromegaly  Resp:  Breathing is  non-labored. Lungs clear to auscultation without wheezing or rhonchi   CV: RRR. No murmurs, rubs or gallops appreciated.  Abd: soft, no tenderness, active BS'S.          Plan:  Protein first diet  Go over diet with aJzmyne Duffy  Exercise: Perform whatever she is capable of  Daily Multivitamin  OM consult  Follow up with OM.         MARLENE AGUSTIN MD            Date: 1/2/2025

## 2025-02-11 ENCOUNTER — OFFICE VISIT (OUTPATIENT)
Dept: SURGERY | Age: 75
End: 2025-02-11
Payer: MEDICARE

## 2025-02-11 VITALS
SYSTOLIC BLOOD PRESSURE: 165 MMHG | DIASTOLIC BLOOD PRESSURE: 84 MMHG | BODY MASS INDEX: 32.27 KG/M2 | HEIGHT: 64 IN | WEIGHT: 189 LBS | HEART RATE: 77 BPM

## 2025-02-11 DIAGNOSIS — I10 ESSENTIAL HYPERTENSION, BENIGN: ICD-10-CM

## 2025-02-11 DIAGNOSIS — Z98.84 S/P LAPAROSCOPIC SLEEVE GASTRECTOMY: ICD-10-CM

## 2025-02-11 DIAGNOSIS — G47.33 OSA (OBSTRUCTIVE SLEEP APNEA): Primary | ICD-10-CM

## 2025-02-11 DIAGNOSIS — E65 ABDOMINAL OBESITY: ICD-10-CM

## 2025-02-11 DIAGNOSIS — I83.93 VARICOSE VEINS OF BOTH LOWER EXTREMITIES, UNSPECIFIED WHETHER COMPLICATED: ICD-10-CM

## 2025-02-11 DIAGNOSIS — E55.9 VITAMIN D DEFICIENCY: ICD-10-CM

## 2025-02-11 DIAGNOSIS — E78.2 MIXED HYPERLIPIDEMIA: ICD-10-CM

## 2025-02-11 DIAGNOSIS — G47.33 OSA (OBSTRUCTIVE SLEEP APNEA): ICD-10-CM

## 2025-02-11 DIAGNOSIS — J45.20 MILD INTERMITTENT ASTHMA WITHOUT COMPLICATION: ICD-10-CM

## 2025-02-11 DIAGNOSIS — E89.0 POSTOPERATIVE HYPOTHYROIDISM: ICD-10-CM

## 2025-02-11 DIAGNOSIS — E66.09 CLASS 1 OBESITY DUE TO EXCESS CALORIES WITH SERIOUS COMORBIDITY AND BODY MASS INDEX (BMI) OF 32.0 TO 32.9 IN ADULT: ICD-10-CM

## 2025-02-11 DIAGNOSIS — Z86.39 HISTORY OF MORBID OBESITY: ICD-10-CM

## 2025-02-11 DIAGNOSIS — E66.811 CLASS 1 OBESITY DUE TO EXCESS CALORIES WITH SERIOUS COMORBIDITY AND BODY MASS INDEX (BMI) OF 32.0 TO 32.9 IN ADULT: ICD-10-CM

## 2025-02-11 PROBLEM — I82.431 ACUTE DEEP VEIN THROMBOSIS (DVT) OF RIGHT POPLITEAL VEIN (HCC): Status: ACTIVE | Noted: 2024-05-13

## 2025-02-11 PROBLEM — Z98.890 HISTORY OF HIP SURGERY: Status: ACTIVE | Noted: 2018-02-15

## 2025-02-11 PROBLEM — K57.30 DIVERTICULOSIS OF COLON: Status: ACTIVE | Noted: 2019-11-25

## 2025-02-11 PROBLEM — Z86.69 HISTORY OF SLEEP APNEA: Status: ACTIVE | Noted: 2017-06-30

## 2025-02-11 PROBLEM — K56.2 VOLVULUS (HCC): Status: ACTIVE | Noted: 2025-02-11

## 2025-02-11 PROBLEM — R79.89 ELEVATED LIVER FUNCTION TESTS: Status: ACTIVE | Noted: 2024-12-27

## 2025-02-11 PROBLEM — M25.551 PAIN OF BOTH HIP JOINTS: Status: RESOLVED | Noted: 2018-10-31 | Resolved: 2025-02-11

## 2025-02-11 PROBLEM — M48.061 SPINAL STENOSIS OF LUMBAR REGION AT MULTIPLE LEVELS: Status: RESOLVED | Noted: 2019-05-16 | Resolved: 2025-02-11

## 2025-02-11 PROBLEM — K27.9 PUD (PEPTIC ULCER DISEASE): Status: ACTIVE | Noted: 2022-03-25

## 2025-02-11 PROBLEM — K21.00 GASTROESOPHAGEAL REFLUX DISEASE WITH ESOPHAGITIS WITHOUT HEMORRHAGE: Status: ACTIVE | Noted: 2025-02-11

## 2025-02-11 PROBLEM — Z87.11 HISTORY OF PEPTIC ULCER DISEASE: Status: ACTIVE | Noted: 2025-02-11

## 2025-02-11 PROBLEM — Q43.8 OTHER SPECIFIED CONGENITAL MALFORMATIONS OF INTESTINE: Status: ACTIVE | Noted: 2025-02-11

## 2025-02-11 PROBLEM — C73 PAPILLARY THYROID CARCINOMA (HCC): Status: ACTIVE | Noted: 2018-01-29

## 2025-02-11 PROBLEM — R30.0 DYSURIA: Status: RESOLVED | Noted: 2019-04-29 | Resolved: 2025-02-11

## 2025-02-11 PROBLEM — K64.8 OTHER HEMORRHOIDS: Status: ACTIVE | Noted: 2025-02-11

## 2025-02-11 PROBLEM — M25.552 PAIN OF BOTH HIP JOINTS: Status: RESOLVED | Noted: 2018-10-31 | Resolved: 2025-02-11

## 2025-02-11 PROBLEM — E78.5 HYPERLIPIDEMIA: Status: ACTIVE | Noted: 2021-01-01

## 2025-02-11 PROBLEM — M48.00 SPINAL STENOSIS: Status: RESOLVED | Noted: 2019-05-16 | Resolved: 2025-02-11

## 2025-02-11 PROBLEM — Z90.3 S/P GASTRIC SLEEVE PROCEDURE: Status: ACTIVE | Noted: 2025-02-11

## 2025-02-11 PROBLEM — D64.9 NORMOCHROMIC ANEMIA: Status: ACTIVE | Noted: 2021-01-01

## 2025-02-11 PROBLEM — K21.9 GASTROESOPHAGEAL REFLUX DISEASE WITHOUT ESOPHAGITIS: Status: ACTIVE | Noted: 2019-09-05

## 2025-02-11 LAB
25(OH)D3 SERPL-MCNC: 48.1 NG/ML (ref 30–100)
ALBUMIN SERPL-MCNC: 3.7 G/DL (ref 3.2–4.6)
ALBUMIN/GLOB SERPL: 1.1 (ref 1–1.9)
ALP SERPL-CCNC: 55 U/L (ref 35–104)
ALT SERPL-CCNC: 32 U/L (ref 8–45)
ANION GAP SERPL CALC-SCNC: 11 MMOL/L (ref 7–16)
AST SERPL-CCNC: 29 U/L (ref 15–37)
BASOPHILS # BLD: 0.06 K/UL (ref 0–0.2)
BASOPHILS NFR BLD: 0.7 % (ref 0–2)
BILIRUB SERPL-MCNC: 0.4 MG/DL (ref 0–1.2)
BUN SERPL-MCNC: 20 MG/DL (ref 8–23)
CALCIUM SERPL-MCNC: 10.4 MG/DL (ref 8.8–10.2)
CHLORIDE SERPL-SCNC: 104 MMOL/L (ref 98–107)
CHOLEST SERPL-MCNC: 172 MG/DL (ref 0–200)
CO2 SERPL-SCNC: 29 MMOL/L (ref 20–29)
CREAT SERPL-MCNC: 0.81 MG/DL (ref 0.6–1.1)
DIFFERENTIAL METHOD BLD: ABNORMAL
EOSINOPHIL # BLD: 0.15 K/UL (ref 0–0.8)
EOSINOPHIL NFR BLD: 1.8 % (ref 0.5–7.8)
ERYTHROCYTE [DISTWIDTH] IN BLOOD BY AUTOMATED COUNT: 13.4 % (ref 11.9–14.6)
EST. AVERAGE GLUCOSE BLD GHB EST-MCNC: 135 MG/DL
GLOBULIN SER CALC-MCNC: 3.4 G/DL (ref 2.3–3.5)
GLUCOSE SERPL-MCNC: 94 MG/DL (ref 70–99)
HBA1C MFR BLD: 6.3 % (ref 0–5.6)
HCT VFR BLD AUTO: 34.5 % (ref 35.8–46.3)
HDLC SERPL-MCNC: 52 MG/DL (ref 40–60)
HDLC SERPL: 3.3 (ref 0–5)
HGB BLD-MCNC: 11.1 G/DL (ref 11.7–15.4)
IMM GRANULOCYTES # BLD AUTO: 0.02 K/UL (ref 0–0.5)
IMM GRANULOCYTES NFR BLD AUTO: 0.2 % (ref 0–5)
LDLC SERPL CALC-MCNC: 81 MG/DL (ref 0–100)
LYMPHOCYTES # BLD: 2.53 K/UL (ref 0.5–4.6)
LYMPHOCYTES NFR BLD: 29.7 % (ref 13–44)
MCH RBC QN AUTO: 29.2 PG (ref 26.1–32.9)
MCHC RBC AUTO-ENTMCNC: 32.2 G/DL (ref 31.4–35)
MCV RBC AUTO: 90.8 FL (ref 82–102)
MONOCYTES # BLD: 0.81 K/UL (ref 0.1–1.3)
MONOCYTES NFR BLD: 9.5 % (ref 4–12)
NEUTS SEG # BLD: 4.95 K/UL (ref 1.7–8.2)
NEUTS SEG NFR BLD: 58.1 % (ref 43–78)
NRBC # BLD: 0 K/UL (ref 0–0.2)
PLATELET # BLD AUTO: 276 K/UL (ref 150–450)
PMV BLD AUTO: 10.9 FL (ref 9.4–12.3)
POTASSIUM SERPL-SCNC: 3.6 MMOL/L (ref 3.5–5.1)
PROT SERPL-MCNC: 7 G/DL (ref 6.3–8.2)
RBC # BLD AUTO: 3.8 M/UL (ref 4.05–5.2)
SODIUM SERPL-SCNC: 144 MMOL/L (ref 136–145)
TRIGL SERPL-MCNC: 195 MG/DL (ref 0–150)
TSH, 3RD GENERATION: 1.2 UIU/ML (ref 0.27–4.2)
VLDLC SERPL CALC-MCNC: 39 MG/DL (ref 6–23)
WBC # BLD AUTO: 8.5 K/UL (ref 4.3–11.1)

## 2025-02-11 PROCEDURE — 3017F COLORECTAL CA SCREEN DOC REV: CPT | Performed by: PHYSICIAN ASSISTANT

## 2025-02-11 PROCEDURE — 1159F MED LIST DOCD IN RCRD: CPT | Performed by: PHYSICIAN ASSISTANT

## 2025-02-11 PROCEDURE — 99215 OFFICE O/P EST HI 40 MIN: CPT | Performed by: PHYSICIAN ASSISTANT

## 2025-02-11 PROCEDURE — 3079F DIAST BP 80-89 MM HG: CPT | Performed by: PHYSICIAN ASSISTANT

## 2025-02-11 PROCEDURE — 1036F TOBACCO NON-USER: CPT | Performed by: PHYSICIAN ASSISTANT

## 2025-02-11 PROCEDURE — G8399 PT W/DXA RESULTS DOCUMENT: HCPCS | Performed by: PHYSICIAN ASSISTANT

## 2025-02-11 PROCEDURE — G8417 CALC BMI ABV UP PARAM F/U: HCPCS | Performed by: PHYSICIAN ASSISTANT

## 2025-02-11 PROCEDURE — 3077F SYST BP >= 140 MM HG: CPT | Performed by: PHYSICIAN ASSISTANT

## 2025-02-11 PROCEDURE — 1123F ACP DISCUSS/DSCN MKR DOCD: CPT | Performed by: PHYSICIAN ASSISTANT

## 2025-02-11 PROCEDURE — G8427 DOCREV CUR MEDS BY ELIG CLIN: HCPCS | Performed by: PHYSICIAN ASSISTANT

## 2025-02-11 PROCEDURE — 1160F RVW MEDS BY RX/DR IN RCRD: CPT | Performed by: PHYSICIAN ASSISTANT

## 2025-02-11 PROCEDURE — 1090F PRES/ABSN URINE INCON ASSESS: CPT | Performed by: PHYSICIAN ASSISTANT

## 2025-02-11 RX ORDER — METOCLOPRAMIDE 5 MG/1
TABLET ORAL
COMMUNITY
Start: 2025-01-26

## 2025-02-11 RX ORDER — FUROSEMIDE 20 MG/1
20 TABLET ORAL DAILY
COMMUNITY
Start: 2024-12-27 | End: 2025-12-27

## 2025-02-11 NOTE — PROGRESS NOTES
Zandra Burns PA-C                [facility] Weight [facility]       Date of visit: 2/11/2025      History and Physical    Patient: Sarah Ness MRN: [id number]     YOB: 1950  Age: 74 y.o.  Sex: female      Sarah Ness  who presents to the [facility] for consultation to assist in weight loss.  This is her initial consultation preparing her for our multi-disciplinary weight loss program.  She presents with a height of 1.613 m (5' 3.5\") and weight of 85.7 kg (189 lb), giving her a Body mass index is 32.95 kg/m².  She has an ideal body weight of 143 lbs, and excess body weight of 46 lbs.      Initial Neck circumference - 15  Initial Waist circumference - 44  Initial Hip Circumference - 44    Lowest weight 165 lbs  Highest weight 189 lbs  Biggest challenge to weight loss - sleep quality, joint pain limiting exercise    MEDICAL HISTORY:  Morbid Obesity   Anxiety  Hx of DVT - was on Eliquis  Hx of heart cath - 2019  Hx of peptic ulcer disease   Hx of tobacco use - quit 1985    Comorbidity Yes or No   Hypertension- how many medications = 1 Yes   Hyperlipidemia Yes, on medication   Diabetes Mellitus No   Coronary Artery Disease No   Gastroesophageal Reflux  Treatment Med = Omeprazole Yes   Obstructive Sleep Apnea Yes, no CPAP   Cancer Yes, papillary thyroid cancer   Asthma Yes   Osteoarthritis Yes   Joint Pain Yes      Admits to GERD symptoms including (+) heartburn, (+) regurgitation, (-) dysphagia.    Reports current medication regimen including Omeprazole.  Admits to previous testing including EGD.     Other Yes or No   Migraines No   Seizures or Epilepsy No   Glaucoma No   Hyperthyroidism No   Irregular Heartbeat or palpitations No   Gastroparesis No   History of gallstones or pancreatitis No   Malabsorption No   Kidney Stones No       Venous Stasis Yes, on Lasik   Dialysis No   Long term use of steroid or immunocompromised conditions No   Chronic opioids No   On Anticoagulants No

## 2025-02-26 ENCOUNTER — TELEMEDICINE (OUTPATIENT)
Dept: SURGERY | Age: 75
End: 2025-02-26

## 2025-02-26 DIAGNOSIS — Z71.3 NUTRITIONAL COUNSELING: Primary | ICD-10-CM

## 2025-02-26 NOTE — PROGRESS NOTES
Obesity Medicine Initial Nutrition Assessment     Assessment:    Initial Consult Date 02/11/25   Initial Height 5'3\"   Initial Weight 189lb        Estimated Nutrition Needs:  EEN for weight loss = MSJ x 1.3 - 500kcal/day = 1220kcal/day  Protein: 52-78g/day (1.0-1.5 gm/kg IBW)    Carbohydrate: 137g/day (45%)    Fat: 47g/day (35%)     Eating Habits:   Eating occasions/d: 3 x daily with snacks   Main cook at home: Pt is main cook at home   Restaurant/Fast Food Intake: 3 x weekly   Typical Beverage Consumption: Decaf coffee, coconut water, and herbal tea  Food Allergies: None   Cultural Preferences: None   Diet Recall:   Breakfast: Egg, toast, and fruit  Lunch: Taco - chx and tortilla with humus   Dinner: Yogurt with cereal, tonja, unsweetened coconut  Snack: Ice cream bar     Lifestyle Assessment:    Hours of sleep/night: ~ 5-6 hours broken up    Current Occupation: Retired    Number of people living in house and influence: 2 people including pt and      Exercise Assessment:   PAR-Q: No contraindications to exercise   Medical:     Pain or injuries (present): Neck pain, varicose veins, hip pain- bilateral     Past surgeries related to mobility: Hip sx - rt, back sx-stenosis, knee sx  Exercise equipment at home: Free [employer] Later    Gym Membership: Yes - [employer]  Current Exercise Routine: Some walking (try to walk 2500 steps daily). Starting PT tomorrow (02/27)  Assessment Exercise Goal: Continue current exercise and increase as able    DIAGNOSIS:  Class I obesity R/T hx of yoyo dieting and excessive energy intake as evidenced by BMI = 32.95 and 164 % IBW.     Intervention:   Evaluated diet recall and identified modifications.  Educated pt on mindful eating techniques and macronutrients.  Encouraged continued and increased activity.          Monitoring and Evaluation:  Monitor for continued safe, supervised weight loss for OM   F/U per MD Ana Smith MBA. RD, LD

## 2025-02-27 ENCOUNTER — HOSPITAL ENCOUNTER (OUTPATIENT)
Dept: PHYSICAL THERAPY | Age: 75
Setting detail: RECURRING SERIES
End: 2025-02-27
Payer: MEDICARE

## 2025-02-27 DIAGNOSIS — M62.81 MUSCLE WEAKNESS (GENERALIZED): ICD-10-CM

## 2025-02-27 DIAGNOSIS — M25.551 PAIN IN RIGHT HIP: ICD-10-CM

## 2025-02-27 DIAGNOSIS — M54.2 NECK PAIN: Primary | ICD-10-CM

## 2025-02-27 DIAGNOSIS — M79.604 PAIN IN RIGHT LEG: ICD-10-CM

## 2025-02-27 DIAGNOSIS — M79.605 PAIN IN LEFT LEG: ICD-10-CM

## 2025-02-27 PROCEDURE — 97162 PT EVAL MOD COMPLEX 30 MIN: CPT

## 2025-02-27 PROCEDURE — 97110 THERAPEUTIC EXERCISES: CPT

## 2025-02-27 NOTE — THERAPY EVALUATION
Sarah Ness  : 1950  Primary: Medicare Part A And B (Medicare)  Secondary: [employer] MEDICARE SUPP [facility] [place] [address]  Phone: [phone]  Fax: [phone] Plan Frequency: 2/week x 12 weeks    Plan of Care/Certification Expiration Date: 25        Plan of Care/Certification Expiration Date:  Plan of Care/Certification Expiration Date: 25    Frequency/Duration: Plan Frequency: 2/week x 12 weeks      Time In/Out:          PT Visit Info:    Total # of Visits to Date: 1      Visit Count:  1                OUTPATIENT PHYSICAL THERAPY:             Initial Assessment 2025               Episode (neck pain)         Treatment Diagnosis:     Neck pain  Pain in right hip  Pain in right leg  Pain in left leg  Muscle weakness (generalized)  Medical/Referring Diagnosis:    Cervicalgia    Referring Physician:  Stephen Aaron Jr., MD MD Orders:  PT Eval and Treat   Return MD Appt:  prn  Date of Onset:    2 years ago and exacerbation since that time  Allergies:  Sulfa antibiotics and Nisoldipine  Restrictions/Precautions:    History of DVT, fall risks      Medications Last Reviewed: 2025     SUBJECTIVE   History of Injury/Illness (Reason for Referral):  Pt presents with neck pain and B leg pain and R hip.  She states she is deconditioned as she has been caring for family members who were sick. She reports several surgeries to include a gastric sleeve in , back surgery , hip surgery - addressing IT band, patellar fracture comminuted  and unrepaired torn rotator L shoulder. She states she mostly is assisting her  as he has decreased mobility.  She is walking 2500 steps a day using a pedometer.  She states she has not slept in a bed for the past 2 years - she is sleeping in a chair.  She has severe gastric reflux and is seeing a GI physician for this.  She is not sleeping well. She sleeps for      Therapy Prognosis: Good    Initial Assessment Complexity:     Decision Making: Medium Complexity      PLAN   Effective Dates: 2/27/2025 TO Plan of Care/Certification Expiration Date: 05/22/25     Frequency/Duration: Plan Frequency: 2/week x 12 weeks      Interventions Planned (Treatment may consist of any combination of the following):    Current Treatment Recommendations: Strengthening; ROM; Functional mobility training; Manual; Home exercise program; Aquatics    Goals: (Goals have been discussed and agreed upon with patient.)  Short-Term Functional Goals: Time Frame:  3 weeks  Pt to report decrease in symptoms  Pt independent with HEP  Cervical ROM WFL to allow for increased ease with driving   Discharge Goals: Time Frame: 8-12 wee\ks  Increased strength in LE by 1/2 or full grade to support gait and other mobility  Pt to report decrease in pain to no more than 2/10 with daily activities  Pt independent with HEP to maintain gains made in PT  Pt improved score on NDI and LEFS by 8 or greater indicating improved function         Medical Necessity:   > Skilled intervention continues to be required due to neck and LE pain and general weakness effecting daily activities..  Reason For Services/Other Comments:  > Patient continues to require skilled intervention due to goals not met.      Regarding Sarah MALLORY Ness's therapy, I certify that the treatment plan above will be carried out by a therapist or under their direction.  Thank you for this referral,  Meera Han, PT     Referring Physician Signature: Stephen Aaron Jr.,* _______________________________ Date _____________        Charge Capture  Events  Appt Desk  Attendance Report

## 2025-02-28 ENCOUNTER — HOSPITAL ENCOUNTER (OUTPATIENT)
Dept: NON INVASIVE DIAGNOSTICS | Age: 75
Discharge: HOME | End: 2025-02-28
Payer: MEDICARE

## 2025-02-28 DIAGNOSIS — E89.0 POSTOPERATIVE HYPOTHYROIDISM: ICD-10-CM

## 2025-02-28 DIAGNOSIS — E78.2 MIXED HYPERLIPIDEMIA: ICD-10-CM

## 2025-02-28 DIAGNOSIS — J45.20 MILD INTERMITTENT ASTHMA WITHOUT COMPLICATION: ICD-10-CM

## 2025-02-28 DIAGNOSIS — G47.33 OSA (OBSTRUCTIVE SLEEP APNEA): ICD-10-CM

## 2025-02-28 DIAGNOSIS — I83.93 VARICOSE VEINS OF BOTH LOWER EXTREMITIES, UNSPECIFIED WHETHER COMPLICATED: ICD-10-CM

## 2025-02-28 DIAGNOSIS — I10 ESSENTIAL HYPERTENSION, BENIGN: ICD-10-CM

## 2025-02-28 LAB
EKG ATRIAL RATE: 80 BPM
EKG DIAGNOSIS: NORMAL
EKG P AXIS: 64 DEGREES
EKG P-R INTERVAL: 188 MS
EKG Q-T INTERVAL: 366 MS
EKG QRS DURATION: 100 MS
EKG QTC CALCULATION (BAZETT): 422 MS
EKG R AXIS: 45 DEGREES
EKG T AXIS: 26 DEGREES
EKG VENTRICULAR RATE: 80 BPM

## 2025-02-28 PROCEDURE — 93010 ELECTROCARDIOGRAM REPORT: CPT | Performed by: INTERNAL MEDICINE

## 2025-02-28 PROCEDURE — 93005 ELECTROCARDIOGRAM TRACING: CPT

## 2025-02-28 NOTE — PROGRESS NOTES
Sarah Ness  : 1950  Primary: Medicare Part A And B (Medicare)  Secondary: [employer] MEDICARE SUPP [facility] [place] [address]  Phone: [phone]  Fax: [phone] Plan Frequency: 2/week x 12 weeks    Plan of Care/Certification Expiration Date: 25        Plan of Care/Certification Expiration Date:  Plan of Care/Certification Expiration Date: 25    Frequency/Duration:   Plan Frequency: 2/week x 12 weeks      Time In/Out:          PT Visit Info:    Total # of Visits to Date: 1      Visit Count:  1    OUTPATIENT PHYSICAL THERAPY:   Treatment Note 2025       Episode  (neck pain)               Treatment Diagnosis:     Neck pain   Pain in right hip   Pain in right leg   Pain in left leg   Muscle weakness (generalized)  Medical/Referring Diagnosis:    Cervicalgia    Referring Physician:  Stephen Aaron Jr., MD MD Orders:  PT Eval and Treat   Return MD Appt:   as needed   Date of Onset:  No data recorded  2 years ago  Allergies:   Sulfa antibiotics and Nisoldipine  Restrictions/Precautions:   DVT in past      Interventions Planned (Treatment may consist of any combination of the following):  Current Treatment Recommendations: Strengthening; ROM; Functional mobility training; Manual; Home exercise program; Aquatics    See Assessment Note    Subjective Comments:   See eval  Initial Pain Level:     4 /10  Post Session Pain Level:    2   /10  Medications Last Reviewed: 2025  Updated Objective Findings:  See Evaluation Note from today  Treatment   THERAPEUTIC EXERCISE: (15 minutes):    Exercises per grid below to improve mobility.  Required minimal visual and verbal cues to promote proper body alignment and promote proper body posture.  Progressed range and repetitions as indicated.   Date:  25 Date:   Date:     Activity/Exercise Parameters Parameters Parameters   Cervical retractions in sitting  2 x 10                                          HEP - cervical retractions    Treatment/Session Summary:    Treatment Assessment:   Improved ROM with retraction  Communication/Consultation:  Therapy Evaluation sent to referring provider  Equipment provided today:  HEP  Recommendations/Intent for next treatment session: Next visit will focus on cervical treatment and generalized strengthening and mobility training.    >Total Treatment Billable Duration:  15 minutes         Meera Han PT         Charge Capture  Events  [employer] Portal  Appt Desk  Attendance Report     Future Appointments   Date Time Provider Department Center   2/28/2025  1:00 PM SFD DT EKG [facility]   3/5/2025  1:00 PM Melanie Gonzalez, PTA [facility]   3/7/2025 11:15 AM Meera Han, PT [facility]   3/18/2025  1:45 PM Bobbi Bellamy, PTA [facility]   3/20/2025 11:15 AM Meera Han, PT [facility]   3/25/2025  1:45 PM Bobbi Bellamy, PTA [facility]   3/27/2025 11:15 AM Meera Han, PT [facility]   5/5/2025 10:40 AM Carlos Su MD END [facility]

## 2025-03-05 ENCOUNTER — HOSPITAL ENCOUNTER (OUTPATIENT)
Dept: PHYSICAL THERAPY | Age: 75
Setting detail: RECURRING SERIES
End: 2025-03-05
Payer: MEDICARE

## 2025-03-07 ENCOUNTER — HOSPITAL ENCOUNTER (OUTPATIENT)
Dept: PHYSICAL THERAPY | Age: 75
Setting detail: RECURRING SERIES
Discharge: HOME | End: 2025-03-10
Payer: MEDICARE

## 2025-03-07 PROCEDURE — 97110 THERAPEUTIC EXERCISES: CPT

## 2025-03-07 NOTE — PROGRESS NOTES
Sarah Ness  : 1950  Primary: Medicare Part A And B (Medicare)  Secondary: [employer] MEDICARE SUPP [facility] [place] [address]  Phone: [phone]  Fax: [phone] Plan Frequency: 2/week x 12 weeks    Plan of Care/Certification Expiration Date: 25        Plan of Care/Certification Expiration Date:  Plan of Care/Certification Expiration Date: 25    Frequency/Duration:   Plan Frequency: 2/week x 12 weeks      Time In/Out:   Time In: 1115  Time Out: 1200      PT Visit Info:    Total # of Visits to Date: 2      Visit Count:  2    OUTPATIENT PHYSICAL THERAPY:   Treatment Note 3/7/2025       Episode  (neck pain)               Treatment Diagnosis:     Neck pain   Pain in right hip   Pain in right leg   Pain in left leg   Muscle weakness (generalized)  Medical/Referring Diagnosis:    Cervicalgia    Referring Physician:  Stephen Aaron Jr., MD MD Orders:  PT Eval and Treat   Return MD Appt:   as needed   Date of Onset:  No data recorded  2 years ago  Allergies:   Sulfa antibiotics and Nisoldipine  Restrictions/Precautions:   DVT in past      Interventions Planned (Treatment may consist of any combination of the following):  Current Treatment Recommendations: Strengthening; ROM; Functional mobility training; Manual; Home exercise program; Aquatics    See Assessment Note    Subjective Comments:   I have been doing the exercises and they do make a difference.  There is a little relief and I can move better  Initial Pain Level:     3 10  Post Session Pain Level:    2   /10  Medications Last Reviewed: 3/7/2025  Updated Objective Findings:  None Today  Treatment   THERAPEUTIC EXERCISE: (45 minutes):    Exercises per grid below to improve mobility.  Required minimal visual and verbal cues to promote proper body alignment and promote proper body posture.  Progressed range and repetitions as indicated.   Date:  25 Date:  3/7/25

## 2025-03-18 ENCOUNTER — HOSPITAL ENCOUNTER (OUTPATIENT)
Dept: PHYSICAL THERAPY | Age: 75
Setting detail: RECURRING SERIES
Discharge: HOME | End: 2025-03-21
Payer: MEDICARE

## 2025-03-18 PROCEDURE — 97113 AQUATIC THERAPY/EXERCISES: CPT

## 2025-03-18 NOTE — PROGRESS NOTES
Date   Date   Date     Activity/ Exercise Parameters Parameters Parameters Parameters Parameters   Walking forward 4 lengths       Walking backward 2 lengths       Walking sideways 2 lengths         Marching 2 lengths         Goose Step 2 lengths         Tip toes 1 length         Heels 1 length         Lunges        Side step squats        LE Exercises No resistance          Hip Flex/Ext X 10 B         Hip Abd/Add X 10 B         Hip IR/ER          Calf raises          Knee Flex X 10 B         Squats          Leg Circles X 10 B each direction          Step Ups Red step   X 10 B   Forward and side step        UE Exercises          Squeeze In          Push Down          Pull Down          Bicep/Tricep        Rows/Press outs         Chi Positions          Trunk Rotation        Deep H2O/ Noodles Straddle          Stabilization          Arms only Arms and legs- 4 lengths- required 2 rest breaks for L shoulder         Legs only        Cross   Country 1 min         Scissors 1 min         Crab walk        Lower abdominal   work           Cardio          Jogging        Lap   Swimming          Stretches At wall          Hamstrings yes         Heelcords ITB - yes         Piriformis          Neck            Treatment/Session Summary:    Treatment Assessment:   Patient reports feeling very tired.  No other complaints.  Will continue land and aquatic exercises.   Communication/Consultation:  None today  Equipment provided today:  HEP  Recommendations/Intent for next treatment session: Next visit will focus on cervical treatment and generalized strengthening and mobility training.    >Total Treatment Billable Duration:  40 minutes   Time In: 1345  Time Out: 1430     Bobbi Bellamy PTA         Charge Capture  Events  [employer] Portal  Appt Desk  Attendance Report     Future Appointments   Date Time Provider Department Center   3/20/2025 11:15 AM Meera Han PT [facility]   3/25/2025  1:45 PM Bobbi Bellamy PTA [facility]

## 2025-03-20 ENCOUNTER — HOSPITAL ENCOUNTER (OUTPATIENT)
Dept: PHYSICAL THERAPY | Age: 75
Setting detail: RECURRING SERIES
Discharge: HOME | End: 2025-03-23
Payer: MEDICARE

## 2025-03-20 PROCEDURE — 97110 THERAPEUTIC EXERCISES: CPT

## 2025-03-20 NOTE — PROGRESS NOTES
Sarah Ness  : 1950  Primary: Medicare Part A And B (Medicare)  Secondary: [employer] HE[employer] C[employer] MEDICARE SUPP [facility] [place] [address]  Phone: [phone]  Fax: [phone] Plan Frequency: 2/week x 12 weeks    Plan of Care/Certification Expiration Date: 25        Plan of Care/Certification Expiration Date:  Plan of Care/Certification Expiration Date: 25    Frequency/Duration:   Plan Frequency: 2/week x 12 weeks      Time In/Out:          PT Visit Info:    Total # of Visits to Date: 3      Visit Count:  4    OUTPATIENT PHYSICAL THERAPY:   Treatment Note 3/20/2025       Episode  (neck pain)               Treatment Diagnosis:     Neck pain   Pain in right hip   Pain in right leg   Pain in left leg   Muscle weakness (generalized)  Medical/Referring Diagnosis:    Cervicalgia    Referring Physician:  Stephen Aaron Jr., MD MD Orders:  PT Eval and Treat   Return MD Appt:   as needed   Date of Onset:  No data recorded  2 years ago  Allergies:   Sulfa antibiotics and Nisoldipine  Restrictions/Precautions:   DVT in past      Interventions Planned (Treatment may consist of any combination of the following):  Current Treatment Recommendations: Strengthening; ROM; Functional mobility training; Manual; Home exercise program; Aquatics    See Assessment Note    Subjective Comments:   I liked the pool  My legs are very weak.  I did the exercises for my neck and some real pulling on the R side and now it is back to what it was so I am doing them again.  Maybe I did them wrong.  Initial Pain Level:     3 /10 neck and R hip  Post Session Pain Level:    0   /10 for neck and R hip  Medications Last Reviewed: 3/20/2025  Updated Objective Findings:  None Today  Treatment   THERAPEUTIC EXERCISE: (45 minutes):    Exercises per grid below to improve mobility.  Required minimal visual and verbal cues to promote proper body alignment and promote proper

## 2025-03-25 ENCOUNTER — HOSPITAL ENCOUNTER (OUTPATIENT)
Dept: PHYSICAL THERAPY | Age: 75
Setting detail: RECURRING SERIES
Discharge: HOME | End: 2025-03-28
Payer: MEDICARE

## 2025-03-25 PROCEDURE — 97113 AQUATIC THERAPY/EXERCISES: CPT

## 2025-03-25 ASSESSMENT — PAIN SCALES - GENERAL: PAINLEVEL_OUTOF10: 2

## 2025-03-25 NOTE — PROGRESS NOTES
Kierra Pollard MD                 Comprehensive Medical & Surgical Weight Loss       Date of visit: 3/26/2025      History and Physical    Patient: Sarah Ness MRN: [id number]     YOB: 1950  Age: 74 y.o.  Sex: female        Sarah Ness  who presents to the [facility] for follow up after initial consultation to assist in weight loss.  We will review blood work results, medication options and discuss further our multi-disciplinary weight loss program.      She presents with a height of 1.613 m (5' 3.5\") and weight of 86.6 kg (191 lb), giving her a Body mass index is 33.3 kg/m²..           Last Non-Surgical Weight Loss:      3/26/2025    10:49 AM   Non-Surgical Weight Loss Tracker   Consult Date 2/11/2025   Initial Height 5' 3.5\"   Initial Weight 189 lbs   Ideal Body Weight 143 lb   Initial BMI 33   Initial EBW 46 lb   Non-Surgical Initial % Body Fat  44.5%   Is patient taking anti-obesity Meds? No           Initial Neck Circumference 15   Initial Waist Circumference 44   Initial Hip Circumference 44   Initial BF% 44.5     Lowest weight 165 lbs  Highest weight 189 lbs  Biggest challenge to weight loss - sleep quality, joint pain limiting exercise    MEDICAL HISTORY:  Morbid Obesity   Anxiety  Hx of DVT - was on Eliquis  Hx of heart cath - 2019  Hx of peptic ulcer disease   Hx of tobacco use - quit 1985    Comorbidity Yes or No   Hypertension- how many medications = 1 Yes   Hyperlipidemia Yes, on medication   Diabetes Mellitus No   Coronary Artery Disease No   Gastroesophageal Reflux  Treatment Med = Omeprazole Yes   Obstructive Sleep Apnea Yes, no CPAP   Cancer Yes, papillary thyroid cancer   Asthma Yes   Osteoarthritis Yes   Joint Pain Yes      Admits to GERD symptoms including (+) heartburn, (+) regurgitation, (-) dysphagia.    Reports current medication regimen including Omeprazole.  Admits to previous testing including EGD.     Other Yes or No   Migraines No   Seizures or Epilepsy No

## 2025-03-25 NOTE — PROGRESS NOTES
Sarah MALLORY Ness  : 1950  Primary: Medicare Part A And B (Medicare)  Secondary: [employer] MEDICARE SUPP [facility] [place] [address]  Phone: [phone]  Fax: [phone] Plan Frequency: 2/week x 12 weeks    Plan of Care/Certification Expiration Date: 25        Plan of Care/Certification Expiration Date:  Plan of Care/Certification Expiration Date: 25    Frequency/Duration:   Plan Frequency: 2/week x 12 weeks      Time In/Out:   Time In: 1345  Time Out: 1430      PT Visit Info:    Total # of Visits to Date: 5      Visit Count:  5    OUTPATIENT PHYSICAL THERAPY:   Treatment Note 3/25/2025       Episode  (neck pain)               Treatment Diagnosis:     Neck pain   Pain in right hip   Pain in right leg   Pain in left leg   Muscle weakness (generalized)  Medical/Referring Diagnosis:    Cervicalgia    Referring Physician:  Stephen Aaron Jr., MD MD Orders:  PT Eval and Treat   Return MD Appt:   as needed   Date of Onset:  No data recorded  2 years ago  Allergies:   Sulfa antibiotics and Nisoldipine  Restrictions/Precautions:   DVT in past      Interventions Planned (Treatment may consist of any combination of the following):  Current Treatment Recommendations: Strengthening; ROM; Functional mobility training; Manual; Home exercise program; Aquatics    See Assessment Note    Subjective Comments:   Patient reported she did well after her first pool appointment.   Initial Pain Level:     2/10  Post Session Pain Level:       (1-2)/10  Medications Last Reviewed: 3/25/2025  Updated Objective Findings:  None Today  Treatment   Aquatic Therapy (45 minutes): Aquatic treatment performed per flow grid for Decreased muscle strength, Decreased endurance, Decreased range of motion, and Decreased activity endurance.  Cues provided for posture, speed, technique, and core stability.      Aquatic Exercise Log       Date  3-18-25 Date  3-25-25

## 2025-03-26 ENCOUNTER — OFFICE VISIT (OUTPATIENT)
Dept: SURGERY | Age: 75
End: 2025-03-26
Payer: MEDICARE

## 2025-03-26 VITALS
HEIGHT: 64 IN | DIASTOLIC BLOOD PRESSURE: 84 MMHG | BODY MASS INDEX: 32.61 KG/M2 | SYSTOLIC BLOOD PRESSURE: 157 MMHG | WEIGHT: 191 LBS | HEART RATE: 67 BPM

## 2025-03-26 DIAGNOSIS — Z71.3 DIETARY COUNSELING: ICD-10-CM

## 2025-03-26 DIAGNOSIS — Z98.84 S/P LAPAROSCOPIC SLEEVE GASTRECTOMY: ICD-10-CM

## 2025-03-26 DIAGNOSIS — G47.33 OSA (OBSTRUCTIVE SLEEP APNEA): Primary | ICD-10-CM

## 2025-03-26 DIAGNOSIS — R73.03 PREDIABETES: ICD-10-CM

## 2025-03-26 DIAGNOSIS — Z71.82 EXERCISE COUNSELING: ICD-10-CM

## 2025-03-26 DIAGNOSIS — E65 ABDOMINAL OBESITY: ICD-10-CM

## 2025-03-26 DIAGNOSIS — J45.20 MILD INTERMITTENT ASTHMA WITHOUT COMPLICATION: ICD-10-CM

## 2025-03-26 DIAGNOSIS — I10 ESSENTIAL HYPERTENSION, BENIGN: ICD-10-CM

## 2025-03-26 DIAGNOSIS — Z71.3 NUTRITIONAL COUNSELING: ICD-10-CM

## 2025-03-26 DIAGNOSIS — E78.2 MIXED HYPERLIPIDEMIA: ICD-10-CM

## 2025-03-26 PROCEDURE — G8417 CALC BMI ABV UP PARAM F/U: HCPCS | Performed by: SURGERY

## 2025-03-26 PROCEDURE — 1036F TOBACCO NON-USER: CPT | Performed by: SURGERY

## 2025-03-26 PROCEDURE — 3017F COLORECTAL CA SCREEN DOC REV: CPT | Performed by: SURGERY

## 2025-03-26 PROCEDURE — G8399 PT W/DXA RESULTS DOCUMENT: HCPCS | Performed by: SURGERY

## 2025-03-26 PROCEDURE — 1090F PRES/ABSN URINE INCON ASSESS: CPT | Performed by: SURGERY

## 2025-03-26 PROCEDURE — 1123F ACP DISCUSS/DSCN MKR DOCD: CPT | Performed by: SURGERY

## 2025-03-26 PROCEDURE — 1159F MED LIST DOCD IN RCRD: CPT | Performed by: SURGERY

## 2025-03-26 PROCEDURE — G8427 DOCREV CUR MEDS BY ELIG CLIN: HCPCS | Performed by: SURGERY

## 2025-03-26 PROCEDURE — 3079F DIAST BP 80-89 MM HG: CPT | Performed by: SURGERY

## 2025-03-26 PROCEDURE — 3077F SYST BP >= 140 MM HG: CPT | Performed by: SURGERY

## 2025-03-26 PROCEDURE — 99215 OFFICE O/P EST HI 40 MIN: CPT | Performed by: SURGERY

## 2025-03-26 RX ORDER — TIRZEPATIDE 2.5 MG/.5ML
2.5 INJECTION, SOLUTION SUBCUTANEOUS WEEKLY
Qty: 2 ML | Refills: 0 | Status: SHIPPED | OUTPATIENT
Start: 2025-03-26 | End: 2025-04-25

## 2025-03-27 ENCOUNTER — HOSPITAL ENCOUNTER (OUTPATIENT)
Dept: PHYSICAL THERAPY | Age: 75
Setting detail: RECURRING SERIES
Discharge: HOME | End: 2025-03-30
Payer: MEDICARE

## 2025-03-27 PROCEDURE — 97110 THERAPEUTIC EXERCISES: CPT

## 2025-03-27 NOTE — PROGRESS NOTES
Parameters   Cervical retractions in sitting  2 x 5  2 x 5 with therapist overpressure     Retractions with ext 2 x 5 with verbal cueing   UBE in gym Assisted with set up   SLR    SAQ    Sit to stand    Standing lumbar extension    Education Went through gym and assisted her with machines she could continue to use now.   HEP - cervical retractions, cervical with extension. Standing lumbar ext Continue with tweaks made today.    Treatment/Session Summary:    Treatment Assessment:   Neck pain much reduced with retractions.  Encouraged pt to continue with them even if there is some pain.  Like today started with 5/10 pain and reduced to 2/10 and more movement in neck.  Pt agreed.   Communication/Consultation:  None today  Equipment provided today:  HEP  Recommendations/Intent for next treatment session: Continue with aquatics and land based therapy cervical and lumbar derangements    >Total Treatment Billable Duration:  45 minutes   Time In: 1115  Time Out: 1200     Meera Han PT         Charge Capture  Events  Intacct Portal  Appt Desk  Attendance Report     Future Appointments   Date Time Provider Department Center   4/15/2025  1:30 PM Carlos Decker MD [facility]   4/29/2025 11:00 AM Kierra Jones MD [facility]   5/5/2025 10:40 AM Carlos Su MD [facility]

## 2025-04-01 ENCOUNTER — TELEPHONE (OUTPATIENT)
Dept: SURGERY | Age: 75
End: 2025-04-01

## 2025-04-01 ENCOUNTER — TELEPHONE (OUTPATIENT)
Dept: ENDOCRINOLOGY | Age: 75
End: 2025-04-01

## 2025-04-01 NOTE — TELEPHONE ENCOUNTER
Spoke to the Pt, she states she knows she had a thyroidectomy and wants to know if she has here parathyroid still? She is seeing Dr. Smith and that is one of the questions she was asked.

## 2025-04-01 NOTE — TELEPHONE ENCOUNTER
Patient called stating she got the Zepbound through [facility], but realized that it says that patients aren't suppose to use the medication if they have had thyroid cancer and/or are allergic to sulfa drugs. Patient states she had papillary thyroid cancer before and is allergic to sulfa antibiotics. Patient wants to know if it is okay for her to take this medication?

## 2025-04-02 NOTE — TELEPHONE ENCOUNTER
Called patient back to let her know that Zepbound is contraindicated for patients with a history of Medullary thyroid cancer and not Papillary, but she can speak with her thyroid doctor to see what they say. Patient stated that she is upset now because she has already paid for the medication and now she feels like the provider did not look at her records before prescribing the medication. Patient also stated that she feels disappointed and not confident in the care. Patient stated she is also allergic to Sulfa drugs and states that it says on the website that patient's aren't suppose to take Zepbound if they're allergic to sulfa drugs, Patient stated she would speak with her endocrinologist but she is upset that she already spent the money on the medication and it was $350.

## 2025-04-15 ENCOUNTER — OFFICE VISIT (OUTPATIENT)
Dept: SURGERY | Age: 75
End: 2025-04-15
Payer: MEDICARE

## 2025-04-15 VITALS
HEIGHT: 64 IN | SYSTOLIC BLOOD PRESSURE: 139 MMHG | WEIGHT: 185 LBS | BODY MASS INDEX: 31.58 KG/M2 | HEART RATE: 93 BPM | DIASTOLIC BLOOD PRESSURE: 79 MMHG

## 2025-04-15 DIAGNOSIS — K21.00 GASTROESOPHAGEAL REFLUX DISEASE WITH ESOPHAGITIS WITHOUT HEMORRHAGE: Primary | ICD-10-CM

## 2025-04-15 DIAGNOSIS — Z90.3 S/P GASTRIC SLEEVE PROCEDURE: ICD-10-CM

## 2025-04-15 PROCEDURE — 1159F MED LIST DOCD IN RCRD: CPT | Performed by: SURGERY

## 2025-04-15 PROCEDURE — G8427 DOCREV CUR MEDS BY ELIG CLIN: HCPCS | Performed by: SURGERY

## 2025-04-15 PROCEDURE — 1090F PRES/ABSN URINE INCON ASSESS: CPT | Performed by: SURGERY

## 2025-04-15 PROCEDURE — 1123F ACP DISCUSS/DSCN MKR DOCD: CPT | Performed by: SURGERY

## 2025-04-15 PROCEDURE — 1160F RVW MEDS BY RX/DR IN RCRD: CPT | Performed by: SURGERY

## 2025-04-15 PROCEDURE — 99212 OFFICE O/P EST SF 10 MIN: CPT | Performed by: SURGERY

## 2025-04-15 PROCEDURE — 3078F DIAST BP <80 MM HG: CPT | Performed by: SURGERY

## 2025-04-15 PROCEDURE — G8399 PT W/DXA RESULTS DOCUMENT: HCPCS | Performed by: SURGERY

## 2025-04-15 PROCEDURE — 3017F COLORECTAL CA SCREEN DOC REV: CPT | Performed by: SURGERY

## 2025-04-15 PROCEDURE — 3075F SYST BP GE 130 - 139MM HG: CPT | Performed by: SURGERY

## 2025-04-15 PROCEDURE — G8417 CALC BMI ABV UP PARAM F/U: HCPCS | Performed by: SURGERY

## 2025-04-15 PROCEDURE — 1036F TOBACCO NON-USER: CPT | Performed by: SURGERY

## 2025-04-22 DIAGNOSIS — E89.0 POSTSURGICAL HYPOTHYROIDISM: ICD-10-CM

## 2025-04-22 DIAGNOSIS — Z85.850 HISTORY OF THYROID CANCER: ICD-10-CM

## 2025-04-22 LAB — TSH W FREE THYROID IF ABNORMAL: 0.87 UIU/ML (ref 0.27–4.2)

## 2025-04-25 LAB
THYROGLOB AB SERPL-ACNC: <1 IU/ML (ref 0–0.9)
THYROGLOBULIN: 0.3 NG/ML (ref 1.5–38.5)

## 2025-04-29 ENCOUNTER — OFFICE VISIT (OUTPATIENT)
Dept: SURGERY | Age: 75
End: 2025-04-29
Payer: MEDICARE

## 2025-04-29 VITALS
DIASTOLIC BLOOD PRESSURE: 77 MMHG | HEIGHT: 64 IN | HEART RATE: 65 BPM | SYSTOLIC BLOOD PRESSURE: 134 MMHG | BODY MASS INDEX: 30.39 KG/M2 | WEIGHT: 178 LBS

## 2025-04-29 DIAGNOSIS — Z71.3 NUTRITIONAL COUNSELING: ICD-10-CM

## 2025-04-29 DIAGNOSIS — Z98.84 S/P LAPAROSCOPIC SLEEVE GASTRECTOMY: ICD-10-CM

## 2025-04-29 DIAGNOSIS — Z71.3 DIETARY COUNSELING: ICD-10-CM

## 2025-04-29 DIAGNOSIS — Z90.3 S/P GASTRIC SLEEVE PROCEDURE: ICD-10-CM

## 2025-04-29 DIAGNOSIS — Z71.82 EXERCISE COUNSELING: ICD-10-CM

## 2025-04-29 DIAGNOSIS — E78.2 MIXED HYPERLIPIDEMIA: ICD-10-CM

## 2025-04-29 DIAGNOSIS — G47.33 OSA (OBSTRUCTIVE SLEEP APNEA): ICD-10-CM

## 2025-04-29 DIAGNOSIS — I10 ESSENTIAL HYPERTENSION, BENIGN: ICD-10-CM

## 2025-04-29 DIAGNOSIS — K21.00 GASTROESOPHAGEAL REFLUX DISEASE WITH ESOPHAGITIS WITHOUT HEMORRHAGE: Primary | ICD-10-CM

## 2025-04-29 DIAGNOSIS — J45.20 MILD INTERMITTENT ASTHMA WITHOUT COMPLICATION: ICD-10-CM

## 2025-04-29 DIAGNOSIS — E65 ABDOMINAL OBESITY: ICD-10-CM

## 2025-04-29 PROCEDURE — G8427 DOCREV CUR MEDS BY ELIG CLIN: HCPCS | Performed by: SURGERY

## 2025-04-29 PROCEDURE — 1090F PRES/ABSN URINE INCON ASSESS: CPT | Performed by: SURGERY

## 2025-04-29 PROCEDURE — 1159F MED LIST DOCD IN RCRD: CPT | Performed by: SURGERY

## 2025-04-29 PROCEDURE — 3078F DIAST BP <80 MM HG: CPT | Performed by: SURGERY

## 2025-04-29 PROCEDURE — G8417 CALC BMI ABV UP PARAM F/U: HCPCS | Performed by: SURGERY

## 2025-04-29 PROCEDURE — 1123F ACP DISCUSS/DSCN MKR DOCD: CPT | Performed by: SURGERY

## 2025-04-29 PROCEDURE — G8399 PT W/DXA RESULTS DOCUMENT: HCPCS | Performed by: SURGERY

## 2025-04-29 PROCEDURE — 99215 OFFICE O/P EST HI 40 MIN: CPT | Performed by: SURGERY

## 2025-04-29 PROCEDURE — 3075F SYST BP GE 130 - 139MM HG: CPT | Performed by: SURGERY

## 2025-04-29 PROCEDURE — 1036F TOBACCO NON-USER: CPT | Performed by: SURGERY

## 2025-04-29 RX ORDER — NITROFURANTOIN 25; 75 MG/1; MG/1
100 CAPSULE ORAL 2 TIMES DAILY
COMMUNITY
Start: 2025-04-15

## 2025-04-29 NOTE — PROGRESS NOTES
[facility] NUTRITION REASSESSMENT     Assessment:    Pt reports good dietary compliance since last office visit. Pt is eating at least 3x/d. Pt is drinking water, decaf coffee, propel, herbal tea. Pt is exercising via Gym, walking and yardwork.   Diet Recall:              Breakfast: Veggie omelet, toast              Lunch: Protein bar and walnuts              Dinner: Chx taco with salad and grapes     Intervention:   Evaluated diet recall   Encouraged continued  activity.          Monitoring and Evaluation:  Monitor for continued safe, supervised weight loss for OM.    F/U per MD Ana Smith MBA. RD, LD    
SL tablet Place 1 tablet under the tongue as needed      PARoxetine (PAXIL) 10 MG tablet Take 1 tablet by mouth every morning      zoster recombinant adjuvanted vaccine (SHINGRIX) 50 MCG/0.5ML SUSR injection 0.5mL by IntraMUSCular route once now and then repeat in 2-6 months      pravastatin (PRAVACHOL) 40 MG tablet Take 1 tablet by mouth daily       No current facility-administered medications for this visit.        ALLERGIES:  Allergies   Allergen Reactions    Sulfa Antibiotics Hives     Other Reaction(s): hives, Hives/Swelling-Allergy    Nisoldipine Other (See Comments)     Other Reaction(s): Other (see comments)    Other reaction(s): Unknown, Unknown (comments)       ROS: The patient has no difficulty with chest pain or shortness of breath.  No fever or chills.  Comprehensive 13 point review of systems was otherwise unremarkable except as noted above.    Physical Exam:     /77   Pulse 65   Ht 1.613 m (5' 3.5\")   Wt 80.7 kg (178 lb)   BMI 31.04 kg/m²       General:  Well-developed, well-nourished, no distress.  Psych:  Cooperative, good insight and judgement.  Neuro:  Alert, oriented to person, place and time.  HEENT:  Normocephalic, atraumatic. Sclera clear.  Lungs:  Unlabored breathing. Symmetrical chest expansion.   Chest wall:  No tenderness or deformity.  Heart:  Regular rate and rhythm. No JVD.  Extremities:  Extremities normal, atraumatic, no cyanosis or edema.  Skin:  Skin color, texture, turgor normal. No rashes.    ASSESSMENT:  Morbid obesity with a Body mass index is 31.04 kg/m². during multi-disciplinary weight loss prep program.    Pt notes no changes to comorbid conditions and no side effects to current medication regimen.     OM History   Metformin 500 mg BID - 3/26/2025  Zepbound 2.5 mg weekly 4/29/2025       Diagnosis Orders   1. Gastroesophageal reflux disease with esophagitis without hemorrhage        2. S/P gastric sleeve procedure        3. LILY (obstructive sleep apnea)        4.

## 2025-05-05 ENCOUNTER — OFFICE VISIT (OUTPATIENT)
Dept: ENDOCRINOLOGY | Age: 75
End: 2025-05-05
Payer: MEDICARE

## 2025-05-05 VITALS
SYSTOLIC BLOOD PRESSURE: 132 MMHG | WEIGHT: 181 LBS | RESPIRATION RATE: 16 BRPM | DIASTOLIC BLOOD PRESSURE: 78 MMHG | HEIGHT: 64 IN | BODY MASS INDEX: 30.9 KG/M2 | HEART RATE: 80 BPM | OXYGEN SATURATION: 97 %

## 2025-05-05 DIAGNOSIS — Z85.850 HISTORY OF THYROID CANCER: Primary | ICD-10-CM

## 2025-05-05 DIAGNOSIS — E89.0 POSTSURGICAL HYPOTHYROIDISM: ICD-10-CM

## 2025-05-05 PROBLEM — C73 PAPILLARY THYROID CARCINOMA (HCC): Status: RESOLVED | Noted: 2018-01-29 | Resolved: 2025-05-05

## 2025-05-05 PROCEDURE — G8427 DOCREV CUR MEDS BY ELIG CLIN: HCPCS | Performed by: INTERNAL MEDICINE

## 2025-05-05 PROCEDURE — G8399 PT W/DXA RESULTS DOCUMENT: HCPCS | Performed by: INTERNAL MEDICINE

## 2025-05-05 PROCEDURE — 3075F SYST BP GE 130 - 139MM HG: CPT | Performed by: INTERNAL MEDICINE

## 2025-05-05 PROCEDURE — G8417 CALC BMI ABV UP PARAM F/U: HCPCS | Performed by: INTERNAL MEDICINE

## 2025-05-05 PROCEDURE — G2211 COMPLEX E/M VISIT ADD ON: HCPCS | Performed by: INTERNAL MEDICINE

## 2025-05-05 PROCEDURE — 3078F DIAST BP <80 MM HG: CPT | Performed by: INTERNAL MEDICINE

## 2025-05-05 PROCEDURE — 1090F PRES/ABSN URINE INCON ASSESS: CPT | Performed by: INTERNAL MEDICINE

## 2025-05-05 PROCEDURE — 1159F MED LIST DOCD IN RCRD: CPT | Performed by: INTERNAL MEDICINE

## 2025-05-05 PROCEDURE — 99214 OFFICE O/P EST MOD 30 MIN: CPT | Performed by: INTERNAL MEDICINE

## 2025-05-05 PROCEDURE — 1123F ACP DISCUSS/DSCN MKR DOCD: CPT | Performed by: INTERNAL MEDICINE

## 2025-05-05 PROCEDURE — 1036F TOBACCO NON-USER: CPT | Performed by: INTERNAL MEDICINE

## 2025-05-05 RX ORDER — LEVOTHYROXINE SODIUM 100 UG/1
100 TABLET ORAL
Qty: 90 TABLET | Refills: 3 | Status: SHIPPED | OUTPATIENT
Start: 2025-05-05

## 2025-05-05 ASSESSMENT — ENCOUNTER SYMPTOMS
CONSTIPATION: 1
DIARRHEA: 0
ROS SKIN COMMENTS: DENIES ABNORMAL HAIR LOSS, DRY SKIN.

## 2025-05-05 NOTE — PROGRESS NOTES
Carlos Su MD, [facility]  [address] [place] [address], [facility] [address]  [place] [address]  Phone [phone]  Facsimile [phone]          Sarah Ness is a 75 y.o. female seen 5/5/2025 for follow-up of thyroid cancer and hypothyroidism           ASSESSMENT AND PLAN:    1. 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  Pathology revealed the presence of microscopic invasion of perithyroidal  adipose tissue only with no change in tumor stage, however.  She therefore has stage I disease.  This is an BHARAT low risk tumor and radioactive iodine remnant ablation was therefore not indicated.  We are likely to detect any clinically significant disease persistence or recurrence through periodic monitoring of thyroglobulin levels and neck ultrasounds.  Neck ultrasound negative 2/2022.  Her thyroglobulin level has been very low as expected and seems to correlate with her serum TSH, most recently 4/2025.  At this point she appears to be disease-free.  Follow-up in 1 year with a thyroglobulin level prior to visit.     2. Postsurgical hypothyroidism  TSH at goal 0.4-2.0 (although 0.1-0.4 is acceptable in the absence of thyrotoxic symptoms).       - levothyroxine (SYNTHROID) 100 mcg tablet; Take 1 Tab by mouth Daily (before breakfast).  Dispense: 90 Tab; Refill: 3      Follow-up and Dispositions    Return in about 1 year (around 5/5/2026).         HISTORY OF PRESENT ILLNESS:    THYROID CANCER     Presentation: Right thyroid nodule status post FNA biopsy revealing PTC, status post total thyroidectomy and right central lymphadenectomy 2/15/2018 (pathology revealed a right lobe follicular variant PTC measuring 1.6 cm, negative margins, no angioinvasion, no lymphatic invasion, +microscopic invasion of perithyroidal adipose tissue only with no change in tumor stage; 8 out of

## 2025-06-16 ENCOUNTER — TELEPHONE (OUTPATIENT)
Dept: SURGERY | Age: 75
End: 2025-06-16

## 2025-06-16 NOTE — TELEPHONE ENCOUNTER
Patient called stating she would like to up her dosage of her Zepbound. I let patient know she would need an appointment. I let patient know she has an appointment scheduled for 7/15/25, but I could see if there was one sooner. Patient stated she was going to be out of town at the beginning of July, so she would just keep her original appointment.

## 2025-07-15 ENCOUNTER — OFFICE VISIT (OUTPATIENT)
Dept: SURGERY | Age: 75
End: 2025-07-15
Payer: MEDICARE

## 2025-07-15 VITALS
SYSTOLIC BLOOD PRESSURE: 152 MMHG | WEIGHT: 165 LBS | BODY MASS INDEX: 28.17 KG/M2 | HEART RATE: 69 BPM | DIASTOLIC BLOOD PRESSURE: 82 MMHG | HEIGHT: 64 IN

## 2025-07-15 DIAGNOSIS — Z71.82 EXERCISE COUNSELING: ICD-10-CM

## 2025-07-15 DIAGNOSIS — J45.20 MILD INTERMITTENT ASTHMA WITHOUT COMPLICATION: ICD-10-CM

## 2025-07-15 DIAGNOSIS — I10 ESSENTIAL HYPERTENSION, BENIGN: ICD-10-CM

## 2025-07-15 DIAGNOSIS — K21.00 GASTROESOPHAGEAL REFLUX DISEASE WITH ESOPHAGITIS WITHOUT HEMORRHAGE: Primary | ICD-10-CM

## 2025-07-15 DIAGNOSIS — Z90.3 S/P GASTRIC SLEEVE PROCEDURE: ICD-10-CM

## 2025-07-15 DIAGNOSIS — Z98.84 S/P LAPAROSCOPIC SLEEVE GASTRECTOMY: ICD-10-CM

## 2025-07-15 DIAGNOSIS — Z71.3 NUTRITIONAL COUNSELING: ICD-10-CM

## 2025-07-15 DIAGNOSIS — E78.2 MIXED HYPERLIPIDEMIA: ICD-10-CM

## 2025-07-15 DIAGNOSIS — G47.33 OSA (OBSTRUCTIVE SLEEP APNEA): ICD-10-CM

## 2025-07-15 DIAGNOSIS — E65 ABDOMINAL OBESITY: ICD-10-CM

## 2025-07-15 PROCEDURE — 1090F PRES/ABSN URINE INCON ASSESS: CPT | Performed by: SURGERY

## 2025-07-15 PROCEDURE — 3079F DIAST BP 80-89 MM HG: CPT | Performed by: SURGERY

## 2025-07-15 PROCEDURE — 99215 OFFICE O/P EST HI 40 MIN: CPT | Performed by: SURGERY

## 2025-07-15 PROCEDURE — G8428 CUR MEDS NOT DOCUMENT: HCPCS | Performed by: SURGERY

## 2025-07-15 PROCEDURE — 1036F TOBACCO NON-USER: CPT | Performed by: SURGERY

## 2025-07-15 PROCEDURE — 1123F ACP DISCUSS/DSCN MKR DOCD: CPT | Performed by: SURGERY

## 2025-07-15 PROCEDURE — G8399 PT W/DXA RESULTS DOCUMENT: HCPCS | Performed by: SURGERY

## 2025-07-15 PROCEDURE — G8417 CALC BMI ABV UP PARAM F/U: HCPCS | Performed by: SURGERY

## 2025-07-15 PROCEDURE — 3077F SYST BP >= 140 MM HG: CPT | Performed by: SURGERY

## 2025-07-15 NOTE — PROGRESS NOTES
[facility] NUTRITION REASSESSMENT     Assessment:    Pt reports good dietary compliance since last office visit. Pt is eating at least 3x/d. Pt is drinking water. Pt is not exercising due to recent foot surgery .     Diet Recall:No recall provided but pt reports heavily focusing on eating 3 x daily with lean protein focus     Intervention:   Evaluated diet recall   Encouraged activity- when able.          Monitoring and Evaluation:  Monitor for continued safe, supervised weight loss for OM.    F/U per MD Ana Smith MBA. RD, LD    
   Nutrition Recommendations: please see dietitian notes.     6.         Exercise Recommendations: Exercise routine, incorporating both cardio and strength training, building up to 5x/wk for at least 300 minutes a week.    7. Behavior Recommendations: continue food and exercise journaling        Scripts: Metformin 500 mg BID, Zepbound Vial 5 mg weekly    FU 1 months - RD + PA      Time: I spent 40 minutes preparing to see patient (including chart review and preparation), obtaining and/or reviewing additional medical history, performing a physical exam and evaluation, documenting clinical information in the electronic health record, independently interpreting results, communicating results to patient, family or caregiver, and/or coordinating care.        Signed: Kierra Pollard MD  Bariatric & Minimally Invasive Surgery  7/15/2025

## 2025-08-12 ENCOUNTER — TELEPHONE (OUTPATIENT)
Dept: SURGERY | Age: 75
End: 2025-08-12

## 2025-08-15 ENCOUNTER — OFFICE VISIT (OUTPATIENT)
Dept: SURGERY | Age: 75
End: 2025-08-15
Payer: MEDICARE

## 2025-08-15 VITALS
WEIGHT: 156 LBS | DIASTOLIC BLOOD PRESSURE: 70 MMHG | HEIGHT: 64 IN | BODY MASS INDEX: 26.63 KG/M2 | HEART RATE: 86 BPM | SYSTOLIC BLOOD PRESSURE: 129 MMHG

## 2025-08-15 DIAGNOSIS — Z71.3 NUTRITIONAL COUNSELING: ICD-10-CM

## 2025-08-15 DIAGNOSIS — R73.03 PREDIABETES: ICD-10-CM

## 2025-08-15 DIAGNOSIS — G47.33 OSA (OBSTRUCTIVE SLEEP APNEA): Primary | ICD-10-CM

## 2025-08-15 DIAGNOSIS — E66.3 OVERWEIGHT WITH BODY MASS INDEX (BMI) OF 27 TO 27.9 IN ADULT: ICD-10-CM

## 2025-08-15 DIAGNOSIS — J45.20 MILD INTERMITTENT ASTHMA WITHOUT COMPLICATION: ICD-10-CM

## 2025-08-15 DIAGNOSIS — E78.2 MIXED HYPERLIPIDEMIA: ICD-10-CM

## 2025-08-15 DIAGNOSIS — I10 ESSENTIAL HYPERTENSION, BENIGN: ICD-10-CM

## 2025-08-15 DIAGNOSIS — Z98.84 S/P LAPAROSCOPIC SLEEVE GASTRECTOMY: ICD-10-CM

## 2025-08-15 DIAGNOSIS — Z71.82 EXERCISE COUNSELING: ICD-10-CM

## 2025-08-15 PROBLEM — K44.9 HIATAL HERNIA: Status: ACTIVE | Noted: 2025-08-15

## 2025-08-15 PROCEDURE — G8417 CALC BMI ABV UP PARAM F/U: HCPCS | Performed by: PHYSICIAN ASSISTANT

## 2025-08-15 PROCEDURE — G8399 PT W/DXA RESULTS DOCUMENT: HCPCS | Performed by: PHYSICIAN ASSISTANT

## 2025-08-15 PROCEDURE — 3078F DIAST BP <80 MM HG: CPT | Performed by: PHYSICIAN ASSISTANT

## 2025-08-15 PROCEDURE — G8427 DOCREV CUR MEDS BY ELIG CLIN: HCPCS | Performed by: PHYSICIAN ASSISTANT

## 2025-08-15 PROCEDURE — 99215 OFFICE O/P EST HI 40 MIN: CPT | Performed by: PHYSICIAN ASSISTANT

## 2025-08-15 PROCEDURE — 3074F SYST BP LT 130 MM HG: CPT | Performed by: PHYSICIAN ASSISTANT

## 2025-08-15 PROCEDURE — 1036F TOBACCO NON-USER: CPT | Performed by: PHYSICIAN ASSISTANT

## 2025-08-15 PROCEDURE — 1159F MED LIST DOCD IN RCRD: CPT | Performed by: PHYSICIAN ASSISTANT

## 2025-08-15 PROCEDURE — 1090F PRES/ABSN URINE INCON ASSESS: CPT | Performed by: PHYSICIAN ASSISTANT

## 2025-08-15 PROCEDURE — 1123F ACP DISCUSS/DSCN MKR DOCD: CPT | Performed by: PHYSICIAN ASSISTANT

## 2025-08-15 PROCEDURE — 1160F RVW MEDS BY RX/DR IN RCRD: CPT | Performed by: PHYSICIAN ASSISTANT

## 2025-08-15 RX ORDER — OMEPRAZOLE 40 MG/1
CAPSULE, DELAYED RELEASE ORAL
COMMUNITY
Start: 2025-06-25

## 2025-08-22 ENCOUNTER — TRANSCRIBE ORDERS (OUTPATIENT)
Dept: SCHEDULING | Age: 75
End: 2025-08-22

## 2025-08-22 DIAGNOSIS — Z12.31 OTHER SCREENING MAMMOGRAM: Primary | ICD-10-CM

## (undated) DEVICE — 3M™ TEGADERM™ TRANSPARENT FILM DRESSING FRAME STYLE, 1628, 6 IN X 8 IN (15 CM X 20 CM), 10/CT 8CT/CASE: Brand: 3M™ TEGADERM™

## (undated) DEVICE — GUIDEWIRE WITH ICE™ HYDROPHILIC COATING: Brand: CHOICE™ PT

## (undated) DEVICE — DRAIN KT WND 10FR RND 400ML --

## (undated) DEVICE — 5.0MM PRECISION ROUND

## (undated) DEVICE — CATHETER ANGIO 4FR L100CM S STL NYL JL3.5 3 SEG BRAID SFT

## (undated) DEVICE — GUIDEWIRE 035IN 210CM PTFE COAT FIX COR J TIP 15MM FIRM BODY

## (undated) DEVICE — DRAPE XR C ARM 41X74IN LF --

## (undated) DEVICE — BAND RADIAL COMPR ARTERY 24CM -- REG BX/10

## (undated) DEVICE — NEEDLE HYPO 21GA L1.5IN INTRAMUSCULAR S STL LATCH BVL UP

## (undated) DEVICE — PACK PROCEDURE SURG POST LAMINECTOMY CDS

## (undated) DEVICE — INTENDED FOR TISSUE SEPARATION, AND OTHER PROCEDURES THAT REQUIRE A SHARP SURGICAL BLADE TO PUNCTURE OR CUT.: Brand: BARD-PARKER SAFETY BLADES SIZE 10, STERILE

## (undated) DEVICE — SUTURE VCRL SZ 1 L27IN ABSRB UD L36MM CP-1 1/2 CIR REV CUT J268H

## (undated) DEVICE — SUTURE VCRL + SZ 3-0 L18IN ABSRB UD PS-2 3/8 CIR REV CUT VCP497H

## (undated) DEVICE — 3M™ TEGADERM™ TRANSPARENT FILM DRESSING FRAME STYLE, 1626W, 4 IN X 4-3/4 IN (10 CM X 12 CM), 50/CT 4CT/CASE: Brand: 3M™ TEGADERM™

## (undated) DEVICE — CATHETER DIAG 4FR 110CM 155DEG 0.038IN 9MM MIC LOOP VASC

## (undated) DEVICE — 2000CC GUARDIAN II: Brand: GUARDIAN

## (undated) DEVICE — FLOSEAL HEMOSTATIC MATRIX, 10 ML: Brand: FLOSEAL

## (undated) DEVICE — SUTURE VCRL SZ 2-0 L27IN ABSRB UD L36MM CP-1 1/2 CIR REV J266H

## (undated) DEVICE — REM POLYHESIVE ADULT PATIENT RETURN ELECTRODE: Brand: VALLEYLAB

## (undated) DEVICE — SHTH INTRO W/GW 7F 11CM W/O OB -- AVANTI+ - ORDER AS EA

## (undated) DEVICE — MASTISOL ADHESIVE LIQ 2/3ML

## (undated) DEVICE — 1010 S-DRAPE TOWEL DRAPE 10/BX: Brand: STERI-DRAPE™

## (undated) DEVICE — (D)PREP SKN CHLRAPRP APPL 26ML -- CONVERT TO ITEM 371833

## (undated) DEVICE — Device

## (undated) DEVICE — 3M™ STERI-STRIP™ REINFORCED ADHESIVE SKIN CLOSURES, R1548, 1 IN X 5 IN (25 MM X 125 MM), 4 STRIPS/ENVELOPE: Brand: 3M™ STERI-STRIP™

## (undated) DEVICE — CATHETER DIAG AD 4FR L100CM STD NYL JUDKINS R 4 TRULUMEN

## (undated) DEVICE — KENDALL SCD EXPRESS SLEEVES, KNEE LENGTH, MEDIUM: Brand: KENDALL SCD

## (undated) DEVICE — GLIDESHEATH SLENDER STAINLESS STEEL KIT: Brand: GLIDESHEATH SLENDER

## (undated) DEVICE — DRAPE SHT 3 QTR PROXIMA 53X77 --

## (undated) DEVICE — BONE WAX WHITE: Brand: BONE WAX WHITE

## (undated) DEVICE — INTENDED FOR TISSUE SEPARATION, AND OTHER PROCEDURES THAT REQUIRE A SHARP SURGICAL BLADE TO PUNCTURE OR CUT.: Brand: BARD-PARKER SAFETY BLADES SIZE 15, STERILE

## (undated) DEVICE — SYR 10ML LUER LOK 1/5ML GRAD --

## (undated) DEVICE — GOWN,REINF,POLY,ECL,PP SLV,XL: Brand: MEDLINE

## (undated) DEVICE — SOLUTION IV 1000ML 0.9% SOD CHL

## (undated) DEVICE — CATHETER DIAG AD 5FR L100CM COR GRY HYDRPHLC NYL MPA 2 W/ 2